# Patient Record
Sex: MALE | Race: WHITE | Employment: OTHER | URBAN - METROPOLITAN AREA
[De-identification: names, ages, dates, MRNs, and addresses within clinical notes are randomized per-mention and may not be internally consistent; named-entity substitution may affect disease eponyms.]

---

## 2021-09-09 ENCOUNTER — TRANSFERRED RECORDS (OUTPATIENT)
Dept: HEALTH INFORMATION MANAGEMENT | Facility: CLINIC | Age: 71
End: 2021-09-09

## 2021-09-10 ENCOUNTER — TRANSFERRED RECORDS (OUTPATIENT)
Dept: HEALTH INFORMATION MANAGEMENT | Facility: CLINIC | Age: 71
End: 2021-09-10

## 2021-09-17 ENCOUNTER — MEDICAL CORRESPONDENCE (OUTPATIENT)
Dept: HEALTH INFORMATION MANAGEMENT | Facility: CLINIC | Age: 71
End: 2021-09-17

## 2021-10-12 ENCOUNTER — TRANSCRIBE ORDERS (OUTPATIENT)
Dept: OTHER | Age: 71
End: 2021-10-12

## 2021-10-12 DIAGNOSIS — J98.6 DIAPHRAGMATIC PARALYSIS: Primary | ICD-10-CM

## 2021-10-12 DIAGNOSIS — R47.02 DYSPHASIA: ICD-10-CM

## 2021-10-13 NOTE — TELEPHONE ENCOUNTER
RECORDS RECEIVED FROM: External   REASON FOR VISIT: Diaphragmatic paralysis [J98.6]  Dysphasia,   Date of Appt: 11/29/21   NOTES (FOR ALL VISITS) STATUS DETAILS   OFFICE NOTE from referring provider Care Everywhere SEE INPATIENT NOTES   OFFICE NOTE from other specialist N/A    DISCHARGE SUMMARY from hospital Care Everywhere [facility]:  9/9/21-10/1/21   DISCHARGE REPORT from the ER N/A    OPERATIVE REPORT N/A    MEDICATION LIST Care Everywhere    IMAGING  (FOR ALL VISITS)     EMG N/A    EEG N/A    LUMBAR PUNCTURE Care Everywhere [facility]:  9/10/21   [place] SCAN N/A    ULTRASOUND (CAROTID BILAT) *VASCULAR* N/A    MRI (HEAD, NECK, SPINE) Received [facility]:  MRI Cervical Spine 9/10/21  MRI Brain 9/10/21   CT (HEAD, NECK, SPINE) N/A       Action 10/13/21 MV 8.58am   Action Taken Images quested from [place]    --10/22/21 MV 2.52pm--  Images resolved in PACS

## 2021-10-28 ENCOUNTER — HOSPITAL ENCOUNTER (OUTPATIENT)
Facility: CLINIC | Age: 71
Setting detail: OBSERVATION
Discharge: NURSING, REHAB OR LONG-TERM CARE | End: 2021-10-29
Attending: EMERGENCY MEDICINE | Admitting: HOSPITALIST
Payer: COMMERCIAL

## 2021-10-28 ENCOUNTER — TRANSFERRED RECORDS (OUTPATIENT)
Dept: HEALTH INFORMATION MANAGEMENT | Facility: CLINIC | Age: 71
End: 2021-10-28

## 2021-10-28 DIAGNOSIS — G47.30 SLEEP APNEA, UNSPECIFIED TYPE: ICD-10-CM

## 2021-10-28 DIAGNOSIS — R06.02 SHORTNESS OF BREATH: ICD-10-CM

## 2021-10-28 DIAGNOSIS — G61.0 GBS (GUILLAIN-BARRE SYNDROME) (H): Primary | ICD-10-CM

## 2021-10-28 LAB
ANION GAP SERPL CALCULATED.3IONS-SCNC: 7 MMOL/L (ref 3–14)
BASOPHILS # BLD AUTO: 0 10E3/UL (ref 0–0.2)
BASOPHILS NFR BLD AUTO: 0 %
BUN SERPL-MCNC: 18 MG/DL (ref 7–30)
CALCIUM SERPL-MCNC: 8.8 MG/DL (ref 8.5–10.1)
CHLORIDE BLD-SCNC: 107 MMOL/L (ref 94–109)
CO2 SERPL-SCNC: 26 MMOL/L (ref 20–32)
CREAT SERPL-MCNC: 0.78 MG/DL (ref 0.66–1.25)
EOSINOPHIL # BLD AUTO: 0 10E3/UL (ref 0–0.7)
EOSINOPHIL NFR BLD AUTO: 0 %
ERYTHROCYTE [DISTWIDTH] IN BLOOD BY AUTOMATED COUNT: 13.2 % (ref 10–15)
GFR SERPL CREATININE-BSD FRML MDRD: 90 ML/MIN/1.73M2
GLUCOSE BLD-MCNC: 287 MG/DL (ref 70–99)
GLUCOSE BLDC GLUCOMTR-MCNC: 248 MG/DL (ref 70–99)
HCT VFR BLD AUTO: 44.6 % (ref 40–53)
HGB BLD-MCNC: 14.4 G/DL (ref 13.3–17.7)
IMM GRANULOCYTES # BLD: 0.3 10E3/UL
IMM GRANULOCYTES NFR BLD: 4 %
LYMPHOCYTES # BLD AUTO: 0.7 10E3/UL (ref 0.8–5.3)
LYMPHOCYTES NFR BLD AUTO: 9 %
MCH RBC QN AUTO: 32.7 PG (ref 26.5–33)
MCHC RBC AUTO-ENTMCNC: 32.3 G/DL (ref 31.5–36.5)
MCV RBC AUTO: 101 FL (ref 78–100)
MONOCYTES # BLD AUTO: 0.3 10E3/UL (ref 0–1.3)
MONOCYTES NFR BLD AUTO: 4 %
NEUTROPHILS # BLD AUTO: 5.9 10E3/UL (ref 1.6–8.3)
NEUTROPHILS NFR BLD AUTO: 83 %
NRBC # BLD AUTO: 0 10E3/UL
NRBC BLD AUTO-RTO: 0 /100
PLATELET # BLD AUTO: 173 10E3/UL (ref 150–450)
POTASSIUM BLD-SCNC: 4.4 MMOL/L (ref 3.4–5.3)
RBC # BLD AUTO: 4.4 10E6/UL (ref 4.4–5.9)
SARS-COV-2 RNA RESP QL NAA+PROBE: NEGATIVE
SODIUM SERPL-SCNC: 140 MMOL/L (ref 133–144)
WBC # BLD AUTO: 7.2 10E3/UL (ref 4–11)

## 2021-10-28 PROCEDURE — 99220 PR INITIAL OBSERVATION CARE,LEVEL III: CPT | Performed by: PHYSICIAN ASSISTANT

## 2021-10-28 PROCEDURE — C9803 HOPD COVID-19 SPEC COLLECT: HCPCS

## 2021-10-28 PROCEDURE — 85025 COMPLETE CBC W/AUTO DIFF WBC: CPT | Performed by: EMERGENCY MEDICINE

## 2021-10-28 PROCEDURE — 36415 COLL VENOUS BLD VENIPUNCTURE: CPT | Performed by: EMERGENCY MEDICINE

## 2021-10-28 PROCEDURE — G0378 HOSPITAL OBSERVATION PER HR: HCPCS

## 2021-10-28 PROCEDURE — 87635 SARS-COV-2 COVID-19 AMP PRB: CPT | Performed by: EMERGENCY MEDICINE

## 2021-10-28 PROCEDURE — 99285 EMERGENCY DEPT VISIT HI MDM: CPT

## 2021-10-28 PROCEDURE — 80048 BASIC METABOLIC PNL TOTAL CA: CPT | Performed by: EMERGENCY MEDICINE

## 2021-10-28 ASSESSMENT — ENCOUNTER SYMPTOMS
NAUSEA: 0
ABDOMINAL PAIN: 0
VOMITING: 0
COUGH: 0
DIARRHEA: 0
CHILLS: 0
FEVER: 0
WEAKNESS: 0

## 2021-10-28 NOTE — ED PROVIDER NOTES
History   Chief Complaint:  Shortness of Breath     The history is provided by the patient.      Fer Chiang is a 71 year old male with history of diaphragm paralysis, staphylococcus aureus bacteremia, GERD, diabetes type 2, who presents with EMS after being discharged from [facility]. He was to be transported to his nursing home but was told that they do not have any BiPAP. He notes that he is normally on BiPAP at night to sleep and needs this at night. He denies any shortness of breath.  He denies cough, fever, chills, chest pain, or new troubles breathing. No emesis, diarrhea, or new weakness. He reports that his tracheostomy tube was taken out 2 days ago.     Review of Systems   Constitutional: Negative for chills and fever.   Respiratory: Negative for cough.    Cardiovascular: Negative for chest pain.   Gastrointestinal: Negative for abdominal pain, diarrhea, nausea and vomiting.   Neurological: Negative for weakness.   All other systems reviewed and are negative.      Allergies:  No known drug allergies     Medications:  Pepcid   Insulin   Vistaril   Zofran   Oxycodone   Senna   Aspirin     Past Medical History:    Neuromuscular disease  HERMES  Neck mass   Hyperlipidemia   Type 2 diabetes  Hypertension   Diaphragmatic paralysis  Acute respiratory failure   MSSA    Past Surgical History:    Colonoscopy x4  Tracheostomy     Family History:    Cancer  CAD, mother     Social History:  Smoking status: former smoker  Alcohol use: no  PCP: Joey Figueroa MD [facility]   Presents to the ED alone; daughter now in room    Physical Exam     Patient Vitals for the past 24 hrs:   BP Temp Temp src Pulse Resp SpO2   10/28/21 1900 124/67 -- -- 104 20 96 %   10/28/21 1735 114/87 97.4  F (36.3  C) Oral 85 21 97 %       Physical Exam  General: Resting on the bed.  Head: No obvious trauma to head.  Ears, Nose, Throat:  External ears normal.  Nose normal.   Eyes:  Conjunctivae clear.  Pupils are equal, round, and reactive.    Neck: Normal range of motion.  Neck supple.   CV: Regular rate and rhythm.  No murmurs.      Respiratory: Effort normal and breath sounds normal.  No wheezing or crackles.   Gastrointestinal: Soft.  No distension. There is no tenderness.   Neuro: Alert. Moving all extremities appropriately.  Normal speech.  CN II-XII grossly intact, no pronator drift, normal finger-nose-finger, visual fields intact.  Gross muscle strength intact of the proximal and distal bilateral upper and able to lift both lower extremities off the bed (baseline).  Sensation intact to light touch in all 4 extremities.   Skin: Skin is warm and dry.  No rash noted.     Emergency Department Course     Laboratory:  Glucose by meter: 248(H)    CBC: WBC: 7.2, HGB: 14.4, PLT: 173  BMP: Glucose 287(H), o/w WNL (Creatinine: 0.78)  Asymptomatic COVID-19 PCR: Pending       Emergency Department Course:  Reviewed:  I reviewed the patient's nursing notes, vitals, past medical records, Care Everywhere.     Assessments:  1736 I performed an assessment and examination of the patient as noted above.      1912 Findings and plan explained to the Patient and family who consents to admission. Discussed the patient with Anahy GARCIA for Dr. Iván Candelario, who will admit the patient to a observation bed for further monitoring, evaluation, and treatment.     Consults:  1916  I spoke to Anahy GARCIA for Dr. Iván Candelario of the hospitalist service who accepts the patient for admission.      Disposition:  The patient was admitted to the hospital under the care of Dr. Iván Candelario.       Impression & Plan   Medical Decision Making:  Fer Chiang is a 71 year old male presents with placement concerns and chronic shortness of breath.  Vital signs stable.  Patient has a very complex medical history of recent past.  He is currently unchanged from his discharge from the rehab facility.  He presents with concerns about his nursing home and lack of BiPAP at his nursing  home.  At this point his daughter and him do not feel comfortable with him returning to the nursing home.  Labs look reassuring.  At this point given the patient is not safe to discharge plan to admit for placement.  No further work-up is required at this time.  Hospitalist graciously accepted.      Covid-19  Fer Chiang was evaluated during a global COVID-19 pandemic, which necessitated consideration that the patient might be at risk for infection with the SARS-CoV-2 virus that causes COVID-19.   Applicable protocols for evaluation were followed during the patient's care.   COVID-19 was considered as part of the patient's evaluation. The plan for testing is:  a test was obtained during this visit.    Diagnosis:    ICD-10-CM    1. Sleep apnea, unspecified type  G47.30    2. Shortness of breath  R06.02      Scribe Disclosure:  SENTHIL, Rere Riley, am serving as a scribe at 5:36 PM on 10/28/2021 to document services personally performed by Tierney Hills MD based on my observations and the provider's statements to me.        Tierney Hills MD  10/28/21 1278

## 2021-10-28 NOTE — ED TRIAGE NOTES
Presents to ED from nursing home. Pt was discharged from another hospital today, sent to nursing home because he needs BiPap, and for some reason, the BiPap did not come to nursing home. Pt sent here because facility doesn't have equipment. Pt states he uses the BiPap when he sleeps. Pt just had long admission for paralyzed diaphragm, had a trach, trach removed 2 days ago. Pt denies any new symptoms, but states he may need to be on BiPap earlier than usual as he has had a long day. ABCs intact on room air. A&OX4.

## 2021-10-29 VITALS
TEMPERATURE: 97.3 F | DIASTOLIC BLOOD PRESSURE: 59 MMHG | HEART RATE: 60 BPM | OXYGEN SATURATION: 95 % | RESPIRATION RATE: 14 BRPM | SYSTOLIC BLOOD PRESSURE: 117 MMHG

## 2021-10-29 LAB
GLUCOSE BLDC GLUCOMTR-MCNC: 189 MG/DL (ref 70–99)
GLUCOSE BLDC GLUCOMTR-MCNC: 386 MG/DL (ref 70–99)
GLUCOSE BLDC GLUCOMTR-MCNC: 85 MG/DL (ref 70–99)

## 2021-10-29 PROCEDURE — 36600 WITHDRAWAL OF ARTERIAL BLOOD: CPT

## 2021-10-29 PROCEDURE — 250N000012 HC RX MED GY IP 250 OP 636 PS 637: Performed by: PHYSICIAN ASSISTANT

## 2021-10-29 PROCEDURE — 99207 PR NO BILLABLE SERVICE THIS VISIT: CPT | Performed by: HOSPITALIST

## 2021-10-29 PROCEDURE — 250N000013 HC RX MED GY IP 250 OP 250 PS 637: Mod: GY | Performed by: PHYSICIAN ASSISTANT

## 2021-10-29 PROCEDURE — 82962 GLUCOSE BLOOD TEST: CPT

## 2021-10-29 PROCEDURE — 94660 CPAP INITIATION&MGMT: CPT

## 2021-10-29 PROCEDURE — 999N000157 HC STATISTIC RCP TIME EA 10 MIN

## 2021-10-29 PROCEDURE — 96372 THER/PROPH/DIAG INJ SC/IM: CPT | Performed by: PHYSICIAN ASSISTANT

## 2021-10-29 PROCEDURE — 99217 PR OBSERVATION CARE DISCHARGE: CPT | Performed by: HOSPITALIST

## 2021-10-29 PROCEDURE — G0378 HOSPITAL OBSERVATION PER HR: HCPCS

## 2021-10-29 RX ORDER — PREDNISONE 5 MG/1
35 TABLET ORAL DAILY
DISCHARGE
Start: 2021-11-14 | End: 2021-11-21

## 2021-10-29 RX ORDER — POLYETHYLENE GLYCOL 3350 17 G/17G
17 POWDER, FOR SOLUTION ORAL DAILY PRN
Status: DISCONTINUED | OUTPATIENT
Start: 2021-10-29 | End: 2021-10-29 | Stop reason: HOSPADM

## 2021-10-29 RX ORDER — ONDANSETRON 4 MG/1
4 TABLET, ORALLY DISINTEGRATING ORAL EVERY 6 HOURS PRN
Status: DISCONTINUED | OUTPATIENT
Start: 2021-10-29 | End: 2021-10-29 | Stop reason: HOSPADM

## 2021-10-29 RX ORDER — PREDNISONE 20 MG/1
20 TABLET ORAL DAILY
DISCHARGE
Start: 2021-12-05 | End: 2021-12-12

## 2021-10-29 RX ORDER — METOPROLOL TARTRATE 25 MG/1
25 TABLET, FILM COATED ORAL 2 TIMES DAILY
Status: DISCONTINUED | OUTPATIENT
Start: 2021-10-29 | End: 2021-10-29

## 2021-10-29 RX ORDER — ASPIRIN 81 MG/1
81 TABLET, CHEWABLE ORAL AT BEDTIME
COMMUNITY
Start: 2021-10-01

## 2021-10-29 RX ORDER — LANOLIN ALCOHOL/MO/W.PET/CERES
6 CREAM (GRAM) TOPICAL AT BEDTIME
COMMUNITY
Start: 2021-10-01

## 2021-10-29 RX ORDER — DEXTROSE MONOHYDRATE 25 G/50ML
25-50 INJECTION, SOLUTION INTRAVENOUS
Status: DISCONTINUED | OUTPATIENT
Start: 2021-10-29 | End: 2021-10-29 | Stop reason: HOSPADM

## 2021-10-29 RX ORDER — ACETAMINOPHEN 325 MG/1
650 TABLET ORAL EVERY 8 HOURS PRN
COMMUNITY
Start: 2021-10-01

## 2021-10-29 RX ORDER — PREDNISONE 10 MG/1
30 TABLET ORAL DAILY
Status: ON HOLD | COMMUNITY
Start: 2021-11-21 | End: 2021-10-29

## 2021-10-29 RX ORDER — SULFAMETHOXAZOLE/TRIMETHOPRIM 800-160 MG
1 TABLET ORAL
COMMUNITY
Start: 2021-10-29

## 2021-10-29 RX ORDER — ACETAMINOPHEN 650 MG/1
650 SUPPOSITORY RECTAL EVERY 6 HOURS PRN
Status: DISCONTINUED | OUTPATIENT
Start: 2021-10-29 | End: 2021-10-29 | Stop reason: HOSPADM

## 2021-10-29 RX ORDER — OXYCODONE HYDROCHLORIDE 5 MG/1
5 TABLET ORAL EVERY 4 HOURS PRN
Status: DISCONTINUED | OUTPATIENT
Start: 2021-10-29 | End: 2021-10-29

## 2021-10-29 RX ORDER — SULFAMETHOXAZOLE/TRIMETHOPRIM 800-160 MG
1 TABLET ORAL
Status: DISCONTINUED | OUTPATIENT
Start: 2021-10-29 | End: 2021-10-29

## 2021-10-29 RX ORDER — PYRIDOSTIGMINE BROMIDE 60 MG/1
60 TABLET ORAL EVERY 6 HOURS
Status: DISCONTINUED | OUTPATIENT
Start: 2021-10-29 | End: 2021-10-29

## 2021-10-29 RX ORDER — OXYCODONE HYDROCHLORIDE 5 MG/1
5 TABLET ORAL EVERY 6 HOURS PRN
Status: DISCONTINUED | OUTPATIENT
Start: 2021-10-29 | End: 2021-10-29 | Stop reason: HOSPADM

## 2021-10-29 RX ORDER — NICOTINE POLACRILEX 4 MG
15-30 LOZENGE BUCCAL
Status: DISCONTINUED | OUTPATIENT
Start: 2021-10-29 | End: 2021-10-29 | Stop reason: HOSPADM

## 2021-10-29 RX ORDER — PREDNISONE 5 MG/1
15 TABLET ORAL DAILY
Status: ON HOLD | COMMUNITY
Start: 2021-12-12 | End: 2021-10-29

## 2021-10-29 RX ORDER — PREDNISONE 20 MG/1
20 TABLET ORAL DAILY
Status: ON HOLD | COMMUNITY
Start: 2021-12-05 | End: 2021-10-29

## 2021-10-29 RX ORDER — PYRIDOSTIGMINE BROMIDE 60 MG/1
60 TABLET ORAL EVERY 6 HOURS SCHEDULED
Status: DISCONTINUED | OUTPATIENT
Start: 2021-10-29 | End: 2021-10-29

## 2021-10-29 RX ORDER — PREDNISONE 20 MG/1
40 TABLET ORAL DAILY
Status: ON HOLD | COMMUNITY
Start: 2021-11-07 | End: 2021-10-29

## 2021-10-29 RX ORDER — PREDNISONE 5 MG/1
15 TABLET ORAL DAILY
DISCHARGE
Start: 2021-12-12 | End: 2021-12-19

## 2021-10-29 RX ORDER — SULFAMETHOXAZOLE/TRIMETHOPRIM 800-160 MG
1 TABLET ORAL
Status: DISCONTINUED | OUTPATIENT
Start: 2021-10-30 | End: 2021-10-29

## 2021-10-29 RX ORDER — PYRIDOSTIGMINE BROMIDE 60 MG/1
60 TABLET ORAL EVERY 6 HOURS
COMMUNITY
Start: 2021-10-01

## 2021-10-29 RX ORDER — INSULIN GLARGINE 100 [IU]/ML
12 INJECTION, SOLUTION SUBCUTANEOUS AT BEDTIME
Status: ON HOLD | COMMUNITY
Start: 2021-10-28 | End: 2021-10-29

## 2021-10-29 RX ORDER — PREDNISONE 5 MG/1
45 TABLET ORAL DAILY
DISCHARGE
Start: 2021-10-31 | End: 2021-11-07

## 2021-10-29 RX ORDER — SULFAMETHOXAZOLE/TRIMETHOPRIM 800-160 MG
1 TABLET ORAL
Status: DISCONTINUED | OUTPATIENT
Start: 2021-11-01 | End: 2021-10-29 | Stop reason: HOSPADM

## 2021-10-29 RX ORDER — PYRIDOSTIGMINE BROMIDE 60 MG/1
60 TABLET ORAL EVERY 6 HOURS
Status: DISCONTINUED | OUTPATIENT
Start: 2021-10-29 | End: 2021-10-29 | Stop reason: HOSPADM

## 2021-10-29 RX ORDER — PREDNISONE 10 MG/1
30 TABLET ORAL DAILY
DISCHARGE
Start: 2021-11-21 | End: 2021-11-28

## 2021-10-29 RX ORDER — FAMOTIDINE 20 MG/1
20 TABLET, FILM COATED ORAL 2 TIMES DAILY
Status: DISCONTINUED | OUTPATIENT
Start: 2021-10-29 | End: 2021-10-29

## 2021-10-29 RX ORDER — PREDNISONE 10 MG/1
10 TABLET ORAL DAILY
DISCHARGE
Start: 2021-12-19 | End: 2021-12-26

## 2021-10-29 RX ORDER — METOPROLOL TARTRATE 25 MG/1
25 TABLET, FILM COATED ORAL 2 TIMES DAILY
Status: DISCONTINUED | OUTPATIENT
Start: 2021-10-29 | End: 2021-10-29 | Stop reason: HOSPADM

## 2021-10-29 RX ORDER — PREDNISONE 5 MG/1
35 TABLET ORAL DAILY
Status: ON HOLD | COMMUNITY
Start: 2021-11-14 | End: 2021-10-29

## 2021-10-29 RX ORDER — NALOXONE HYDROCHLORIDE 0.4 MG/ML
0.4 INJECTION, SOLUTION INTRAMUSCULAR; INTRAVENOUS; SUBCUTANEOUS
Status: DISCONTINUED | OUTPATIENT
Start: 2021-10-29 | End: 2021-10-29 | Stop reason: HOSPADM

## 2021-10-29 RX ORDER — SENNOSIDES A AND B 8.6 MG/1
17.2 TABLET, FILM COATED ORAL
COMMUNITY
Start: 2021-10-01

## 2021-10-29 RX ORDER — PREDNISONE 5 MG/1
25 TABLET ORAL DAILY
DISCHARGE
Start: 2021-11-28 | End: 2021-12-05

## 2021-10-29 RX ORDER — PREDNISONE 5 MG/1
25 TABLET ORAL DAILY
Status: ON HOLD | COMMUNITY
Start: 2021-11-28 | End: 2021-10-29

## 2021-10-29 RX ORDER — PREDNISONE 5 MG/1
5 TABLET ORAL DAILY
DISCHARGE
Start: 2021-12-26 | End: 2022-01-02

## 2021-10-29 RX ORDER — AMOXICILLIN 250 MG
2 CAPSULE ORAL 2 TIMES DAILY PRN
Status: DISCONTINUED | OUTPATIENT
Start: 2021-10-29 | End: 2021-10-29 | Stop reason: HOSPADM

## 2021-10-29 RX ORDER — NALOXONE HYDROCHLORIDE 0.4 MG/ML
0.2 INJECTION, SOLUTION INTRAMUSCULAR; INTRAVENOUS; SUBCUTANEOUS
Status: DISCONTINUED | OUTPATIENT
Start: 2021-10-29 | End: 2021-10-29 | Stop reason: HOSPADM

## 2021-10-29 RX ORDER — ONDANSETRON 2 MG/ML
4 INJECTION INTRAMUSCULAR; INTRAVENOUS EVERY 6 HOURS PRN
Status: DISCONTINUED | OUTPATIENT
Start: 2021-10-29 | End: 2021-10-29 | Stop reason: HOSPADM

## 2021-10-29 RX ORDER — PREDNISONE 50 MG/1
50 TABLET ORAL DAILY
Status: ON HOLD | COMMUNITY
Start: 2021-10-28 | End: 2021-10-29

## 2021-10-29 RX ORDER — CALCIUM CARBONATE 500(1250)
500 TABLET,CHEWABLE ORAL EVERY 6 HOURS PRN
COMMUNITY
Start: 2021-10-28

## 2021-10-29 RX ORDER — AMOXICILLIN 250 MG
1 CAPSULE ORAL 2 TIMES DAILY PRN
Status: DISCONTINUED | OUTPATIENT
Start: 2021-10-29 | End: 2021-10-29 | Stop reason: HOSPADM

## 2021-10-29 RX ORDER — POLYETHYLENE GLYCOL 3350 17 G/17G
17 POWDER, FOR SOLUTION ORAL DAILY
Status: DISCONTINUED | OUTPATIENT
Start: 2021-10-29 | End: 2021-10-29 | Stop reason: HOSPADM

## 2021-10-29 RX ORDER — HYDROXYZINE HYDROCHLORIDE 25 MG/1
25 TABLET, FILM COATED ORAL EVERY 6 HOURS PRN
Status: DISCONTINUED | OUTPATIENT
Start: 2021-10-29 | End: 2021-10-29

## 2021-10-29 RX ORDER — OXYCODONE HYDROCHLORIDE 5 MG/1
5 TABLET ORAL EVERY 8 HOURS PRN
Qty: 30 TABLET | Refills: 0 | Status: SHIPPED | OUTPATIENT
Start: 2021-10-29 | End: 2021-11-05

## 2021-10-29 RX ORDER — PREDNISONE 50 MG/1
50 TABLET ORAL DAILY
DISCHARGE
Start: 2021-10-29 | End: 2021-10-31

## 2021-10-29 RX ORDER — POLYETHYLENE GLYCOL 3350 17 G/17G
17 POWDER, FOR SOLUTION ORAL DAILY
COMMUNITY
Start: 2021-10-01

## 2021-10-29 RX ORDER — PREDNISONE 20 MG/1
40 TABLET ORAL DAILY
DISCHARGE
Start: 2021-11-07 | End: 2021-11-14

## 2021-10-29 RX ORDER — FAMOTIDINE 10 MG
10 TABLET ORAL 2 TIMES DAILY
Status: DISCONTINUED | OUTPATIENT
Start: 2021-10-29 | End: 2021-10-29

## 2021-10-29 RX ORDER — ACETAMINOPHEN 325 MG/1
650 TABLET ORAL EVERY 6 HOURS PRN
Status: DISCONTINUED | OUTPATIENT
Start: 2021-10-29 | End: 2021-10-29

## 2021-10-29 RX ORDER — HYDROXYZINE HYDROCHLORIDE 25 MG/1
25 TABLET, FILM COATED ORAL EVERY 6 HOURS PRN
COMMUNITY
Start: 2021-10-28

## 2021-10-29 RX ORDER — ACETAMINOPHEN 325 MG/1
650 TABLET ORAL EVERY 8 HOURS PRN
Status: DISCONTINUED | OUTPATIENT
Start: 2021-10-29 | End: 2021-10-29 | Stop reason: HOSPADM

## 2021-10-29 RX ORDER — FAMOTIDINE 20 MG/1
20 TABLET, FILM COATED ORAL 2 TIMES DAILY
Status: DISCONTINUED | OUTPATIENT
Start: 2021-10-29 | End: 2021-10-29 | Stop reason: HOSPADM

## 2021-10-29 RX ORDER — ASPIRIN 81 MG/1
81 TABLET, CHEWABLE ORAL AT BEDTIME
Status: DISCONTINUED | OUTPATIENT
Start: 2021-10-29 | End: 2021-10-29 | Stop reason: HOSPADM

## 2021-10-29 RX ORDER — PREDNISONE 5 MG/1
5 TABLET ORAL DAILY
Status: ON HOLD | COMMUNITY
Start: 2021-12-26 | End: 2021-10-29

## 2021-10-29 RX ORDER — METOPROLOL TARTRATE 25 MG/1
25 TABLET, FILM COATED ORAL 2 TIMES DAILY
COMMUNITY
Start: 2021-10-28

## 2021-10-29 RX ORDER — PREDNISONE 10 MG/1
10 TABLET ORAL DAILY
Status: ON HOLD | COMMUNITY
Start: 2021-12-19 | End: 2021-10-29

## 2021-10-29 RX ORDER — HYDROXYZINE HYDROCHLORIDE 25 MG/1
25 TABLET, FILM COATED ORAL EVERY 6 HOURS PRN
Status: DISCONTINUED | OUTPATIENT
Start: 2021-10-29 | End: 2021-10-29 | Stop reason: HOSPADM

## 2021-10-29 RX ORDER — LIDOCAINE 40 MG/G
CREAM TOPICAL
Status: DISCONTINUED | OUTPATIENT
Start: 2021-10-29 | End: 2021-10-29 | Stop reason: HOSPADM

## 2021-10-29 RX ORDER — OXYCODONE HYDROCHLORIDE 5 MG/1
5 TABLET ORAL EVERY 8 HOURS PRN
Status: ON HOLD | COMMUNITY
Start: 2021-10-28 | End: 2021-10-29

## 2021-10-29 RX ORDER — PREDNISONE 5 MG/1
45 TABLET ORAL DAILY
Status: ON HOLD | COMMUNITY
Start: 2021-10-31 | End: 2021-10-29

## 2021-10-29 RX ORDER — FAMOTIDINE 20 MG/1
20 TABLET, FILM COATED ORAL 2 TIMES DAILY
COMMUNITY
Start: 2021-10-01

## 2021-10-29 RX ADMIN — PYRIDOSTIGMINE BROMIDE 60 MG: 60 TABLET ORAL at 01:31

## 2021-10-29 RX ADMIN — PREDNISONE 50 MG: 5 TABLET ORAL at 08:42

## 2021-10-29 RX ADMIN — FAMOTIDINE 10 MG: 10 TABLET ORAL at 08:42

## 2021-10-29 RX ADMIN — ACETAMINOPHEN 650 MG: 325 TABLET, FILM COATED ORAL at 01:31

## 2021-10-29 RX ADMIN — METOPROLOL TARTRATE 25 MG: 25 TABLET, FILM COATED ORAL at 08:42

## 2021-10-29 RX ADMIN — SULFAMETHOXAZOLE AND TRIMETHOPRIM 1 TABLET: 800; 160 TABLET ORAL at 08:47

## 2021-10-29 RX ADMIN — PYRIDOSTIGMINE BROMIDE 60 MG: 60 TABLET ORAL at 06:39

## 2021-10-29 RX ADMIN — INSULIN GLARGINE 12 UNITS: 100 INJECTION, SOLUTION SUBCUTANEOUS at 01:32

## 2021-10-29 RX ADMIN — Medication 1 MG: at 04:53

## 2021-10-29 NOTE — PROGRESS NOTES
ROOM # 212-1    Living Situation (if not independent, order SW consult): Long term care   Facility name: [facility]? Unsure of name per patient. New placement.   : Daughter     Activity level at baseline: Indpt   Activity level on admit: SBA      Patient registered to observation; given Patient Bill of Rights; given the opportunity to ask questions about observation status and their plan of care.  Patient has been oriented to the observation room, bathroom and call light is in place.    Discussed discharge goals and expectations with patient/family.

## 2021-10-29 NOTE — DISCHARGE SUMMARY
[facility]  Hospitalist Discharge Summary      Date of Admission:  10/28/2021  Date of Discharge:  10/29/2021  Discharging Provider: Iván Candelario MD      Discharge Diagnoses   Social admit from his long term care facility. Was waiting for his BiPAP machine    Follow-ups Needed After Discharge   Follow-up Appointments     Follow Up and recommended labs and tests      Follow up with Nursing home physician.  No follow up labs or test are   needed.             Unresulted Labs Ordered in the Past 30 Days of this Admission     No orders found for last 31 day(s).      These results will be followed up by NA    Discharge Disposition   Discharged to long-term care facility  Condition at discharge: Stable      Hospital Course   Fer Minor is a 71 year old male who presents by EMS after he arrived at his long-term care facility without a BiPAP being available for sleeping.  Due to lack of this he was sent to the emergency room.     His history is quite complex beginning on September 9 after presenting to the emergency room with shortness of breath and hypoxia.  In addition to this he had lower extremity weakness and dysphagia.  Imaging showed bilateral diaphragmatic paralysis and he was intubated on September 9 for respiratory distress.  It was suspected that he had either Guillain-Barré, post viral neuropathy or myasthenia gravis.  He received plasmapheresis September 11-16 and started on Mestinon.  Neurology was consulted recommending high-dose steroids for 30 days followed by slow taper with continuation of pyridostigmine and glycopyrrolate.  He was extubated on September 20 but required BiPAP that evening with continued work of breathing and eventually a tracheostomy was placed on September 22.  GJ tube was placed on September 29.  His admission was complicated by positive blood cultures on 9/15 for MSSA with initiation on vancomycin eventually transition to Ancef.  AMANDA on 9/27 did not  show vegetations and he received IV cefazolin for 2 weeks ending on 10/4.  He was discharged to a rehab facility on 10/1 and decannulated on 10/26.      He was transported to long term facility today but unfortunately upon arrival they realized he did not have BiPAP available for that evening. He reports everyone seemed confused and he knew he needed that so was sent back to ED. Apparently, now they have the BiPAP at facility but his daughter is refusing to send him back there.         Patient was stable here overnight.  He has no new complaints today.  He is eating breakfast.  He is agreeable to discharging back to his long-term care facility, which has his BiPAP machine now.  I have spoken with our  who will set up transport.  I called his daughter and left a message.  Patient will discharge to his long-term care facility.  No medication changes were made.  He should resume the exact orders that the long-term care facility had received from the TCU.    Consultations This Hospital Stay   CARE MANAGEMENT / SOCIAL WORK IP CONSULT  PHYSICAL THERAPY ADULT IP CONSULT  OCCUPATIONAL THERAPY ADULT IP CONSULT    Code Status   Full Code    Time Spent on this Encounter   I, Iván Candelario MD, personally saw the patient today and spent greater than 30 minutes discharging this patient.       Iván Candelario MD  [facility] OBSERVATION DEPT  [address] [place] [address]  Phone: [phone]  ______________________________________________________________________    Physical Exam   Vital Signs: Temp: 97.6  F (36.4  C) Temp src: Axillary BP: 128/65 Pulse: 66   Resp: 20 SpO2: 96 % O2 Device: BiPAP/CPAP    Weight: 0 lbs 0 oz  Constitutional: awake, alert, cooperative, no apparent distress, and appears stated age  Eyes: Lids and lashes normal, pupils equal, round and reactive to light, extra ocular muscles intact, sclera clear, conjunctiva normal  ENT: Normocephalic, without obvious  abnormality, atraumatic, sinuses nontender on palpation, external ears without lesions, oral pharynx with moist mucous membranes, tonsils without erythema or exudates, gums normal and good dentition.  Respiratory: No increased work of breathing, good air exchange, clear to auscultation bilaterally, no crackles or wheezing  Cardiovascular: Normal apical impulse, regular rate and rhythm, normal S1 and S2, no S3 or S4, and no murmur noted  GI: No scars, normal bowel sounds, soft, non-distended, non-tender, no masses palpated, no hepatosplenomegally       Primary Care Physician   [facility]    Discharge Orders      General info for SNF    Length of Stay Estimate: Long Term Care  Condition at Discharge: Stable  Level of care:skilled   Rehabilitation Potential: Fair  Admission H&P remains valid and up-to-date: Yes  Recent Chemotherapy: N/A  Use Nursing Home Standing Orders: Yes     Mantoux instructions    Give two-step Mantoux (PPD) Per Facility Policy Yes     Follow Up and recommended labs and tests    Follow up with Nursing home physician.  No follow up labs or test are needed.     Reason for your hospital stay    Social admission. Long term care had not received BiPAP machine     Glucose monitor nursing POCT    Before meals and at bedtime     Activity - Up with nursing assistance     Additional Discharge Instructions    BiPAP for sleeping (uses AVAPS mode with last settings RR 16, , EPAP 8, FiO2 30%     Full Code     Physical Therapy Adult Consult    Evaluate and treat as clinically indicated.    Reason:  weakness     Occupational Therapy Adult Consult    Evaluate and treat as clinically indicated.    Reason:  weakness     Fall precautions     Diet    Follow this diet upon discharge: Orders Placed This Encounter      Regular Diet Adult       Significant Results and Procedures   Most Recent 3 CBC's:Recent Labs   Lab Test 10/28/21  1901   WBC 7.2   HGB 14.4   *        Most Recent  3 BMP's:Recent Labs   Lab Test 10/29/21  0740 10/29/21  0113 10/28/21  1901   NA  --   --  140   POTASSIUM  --   --  4.4   CHLORIDE  --   --  107   CO2  --   --  26   BUN  --   --  18   CR  --   --  0.78   ANIONGAP  --   --  7   WILLIAM  --   --  8.8   GLC 85 189* 287*     Most Recent 2 LFT's:No lab results found., No results found for this or any previous visit.    Discharge Medications   Current Discharge Medication List      CONTINUE these medications which have NOT CHANGED    Details   acetaminophen (TYLENOL) 325 MG tablet Take 650 mg by mouth every 8 hours as needed      aspirin (ASA) 81 MG chewable tablet 81 mg by Tube route At Bedtime      calcium carbonate 500 mg, elemental, 1250 (500 Ca) MG tablet chewable 500 mg by Tube route every 6 hours as needed for heartburn      famotidine (PEPCID) 20 MG tablet 20 mg by Tube route 2 times daily      glucagon 1 MG kit Inject 1 mg into the muscle as needed For blood sugar <70 mg/dl      hydrOXYzine (ATARAX) 25 MG tablet 25 mg by Tube route every 6 hours as needed for anxiety      !! insulin glargine (LANTUS PEN) 100 UNIT/ML pen Inject 12 Units Subcutaneous At Bedtime      !! insulin glargine (LANTUS SOLOSTAR) 100 UNIT/ML pen Inject 18 Units Subcutaneous daily      insulin lispro (HUMALOG VIAL) 100 UNIT/ML vial Inject 0-16 Units Subcutaneous 4 times daily Before meals and nightly      melatonin 3 MG tablet 6 mg by Per G Tube route At Bedtime      metoprolol tartrate (LOPRESSOR) 25 MG tablet 25 mg by Tube route 2 times daily      oxyCODONE (ROXICODONE) 5 MG tablet 5 mg by Tube route every 8 hours as needed For moderate pain for up to 7 days. Max Daily Amount: 15 mg.      polyethylene glycol (MIRALAX) 17 GM/Dose powder 17 g by Tube route daily      !! predniSONE (DELTASONE) 50 MG tablet 50 mg by Tube route daily For 3 doses.      pyridostigmine (MESTINON) 60 MG tablet 60 mg by Tube route every 6 hours      senna (SENOKOT) 8.6 MG tablet Take 17.2 mg by mouth nightly as needed       sulfamethoxazole-trimethoprim (BACTRIM DS) 800-160 MG tablet 1 tablet by Tube route three times a week      !! predniSONE (DELTASONE) 10 MG tablet 30 mg by Tube route daily For 7 doses      !! predniSONE (DELTASONE) 10 MG tablet 10 mg by Tube route daily For 7 days      !! predniSONE (DELTASONE) 20 MG tablet 40 mg by Tube route daily      !! predniSONE (DELTASONE) 20 MG tablet 20 mg by Tube route daily For 7 days      !! predniSONE (DELTASONE) 5 MG tablet 45 mg by Tube route daily For 7 doses.      !! predniSONE (DELTASONE) 5 MG tablet 35 mg by Tube route daily For 7 doses      !! predniSONE (DELTASONE) 5 MG tablet 25 mg by Tube route daily For 7 doses      !! predniSONE (DELTASONE) 5 MG tablet 15 mg by Tube route daily For 7 days      !! predniSONE (DELTASONE) 5 MG tablet 5 mg by Tube route daily For 7 days       !! - Potential duplicate medications found. Please discuss with provider.        predniSONE (DELTASONE) 10 MG tablet   Administer 1 tablet (10 mg total) per tube daily for 7 doses.   0 12/19/2021 12/26/2021   predniSONE (DELTASONE) 10 MG tablet   Administer 3 tablets (30 mg total) per tube daily for 7 doses.   0 11/21/2021 11/28/2021   predniSONE (DELTASONE) 20 MG tablet   Administer 1 tablet (20 mg total) per tube daily for 7 doses.   0 12/05/2021 12/12/2021   predniSONE (DELTASONE) 20 MG tablet   Administer 2 tablets (40 mg total) per tube daily for 7 doses.   0 11/07/2021 11/14/2021   predniSONE (DELTASONE) 5 MG tablet   Administer 3 tablets (15 mg total) per tube daily for 7 doses.   0 12/12/2021 12/19/2021   predniSONE (DELTASONE) 5 MG tablet   Administer 5 tablets (25 mg total) per tube daily for 7 doses.   0 11/28/2021 12/05/2021   predniSONE (DELTASONE) 5 MG tablet   Administer 7 tablets (35 mg total) per tube daily for 7 doses.   0 11/14/2021 11/21/2021   predniSONE (DELTASONE) 5 MG tablet   Administer 9 tablets (45 mg total) per tube daily for 7 doses.   0 10/31/2021 11/07/2021   predniSONE  (DELTASONE) 5 MG tablet   Administer 1 tablet (5 mg total) per tube daily for 7 doses.   0 12/26/2021 01/02/2022   predniSONE (DELTASONE) 50 MG tablet   Administer 1 tablet (50 mg total) per tube daily for 3 doses.   0 10/28/2021 10/31/2021       Allergies   No Known Allergies

## 2021-10-29 NOTE — PLAN OF CARE
PRIMARY DIAGNOSIS: Shortness of breath and Sleep apnea with BiPAP Use.   OUTPATIENT/OBSERVATION GOALS TO BE MET BEFORE DISCHARGE:  1. ADLs back to baseline: Yes    2. Activity and level of assistance: Up with standby assistance.    3. Pain status: Improved-controlled with oral pain medications.    4. Return to near baseline physical activity: Yes    Temp: 97.6  F (36.4  C) Temp src: Axillary BP: 131/67 Pulse: 65   Resp: 16 SpO2: 96 % O2 Device: BiPAP/CPAP       Patient is Alert and Oriented x4. He is complaining of 2/10 headache and lower back pain. Tylenol given for pain with effective result. Patient is Saline locked. Pt is a Regular diet. He is SBA with no assistive devices. Plan is for social work consult with plan to discharge back to long term care.     Discharge Planner Nurse   Safe discharge environment identified: Yes  Barriers to discharge: Yes       Entered by: Fadia Jefferson 10/29/2021 4:39 AM     Please review provider order for any additional goals.   Nurse to notify provider when observation goals have been met and patient is ready for discharge.

## 2021-10-29 NOTE — PHARMACY-ADMISSION MEDICATION HISTORY
Admission medication history interview status for this patient is complete. See [facility] admission navigator for allergy information, prior to admission medications and immunization status.     Medication history interview done, indicate source(s): -  Medication history resources (including written lists, pill bottles, clinic record): Discharge medication list from [facility], dated 10/28/2021.     Pharmacy: -    Changes made to PTA medication list:  Added: all medications  Changed: none  Removed: none  Actions taken by pharmacist (provider contacted, etc):None     Additional medication history information:None    Medication reconciliation/reorder completed by provider prior to medication history?  N   (Y/N)     Prior to Admission medications    Medication Sig Last Dose Taking? Auth Provider   acetaminophen (TYLENOL) 325 MG tablet Take 650 mg by mouth every 8 hours as needed  Yes Unknown, Entered By History   aspirin (ASA) 81 MG chewable tablet 81 mg by Tube route At Bedtime  Yes Unknown, Entered By History   calcium carbonate 500 mg, elemental, 1250 (500 Ca) MG tablet chewable 500 mg by Tube route every 6 hours as needed for heartburn  Yes Unknown, Entered By History   famotidine (PEPCID) 20 MG tablet 20 mg by Tube route 2 times daily  Yes Unknown, Entered By History   glucagon 1 MG kit Inject 1 mg into the muscle as needed For blood sugar <70 mg/dl  Yes Unknown, Entered By History   hydrOXYzine (ATARAX) 25 MG tablet 25 mg by Tube route every 6 hours as needed for anxiety  Yes Unknown, Entered By History   insulin glargine (LANTUS PEN) 100 UNIT/ML pen Inject 12 Units Subcutaneous At Bedtime  Yes Unknown, Entered By History   insulin glargine (LANTUS SOLOSTAR) 100 UNIT/ML pen Inject 18 Units Subcutaneous daily  Yes Unknown, Entered By History   insulin lispro (HUMALOG VIAL) 100 UNIT/ML vial Inject 0-16 Units Subcutaneous 4 times daily Before meals and nightly  Yes Unknown, Entered By History    melatonin 3 MG tablet 6 mg by Per G Tube route At Bedtime  Yes Unknown, Entered By History   metoprolol tartrate (LOPRESSOR) 25 MG tablet 25 mg by Tube route 2 times daily  Yes Unknown, Entered By History   oxyCODONE (ROXICODONE) 5 MG tablet 5 mg by Tube route every 8 hours as needed For moderate pain for up to 7 days. Max Daily Amount: 15 mg.  Yes Unknown, Entered By History   polyethylene glycol (MIRALAX) 17 GM/Dose powder 17 g by Tube route daily  Yes Unknown, Entered By History   predniSONE (DELTASONE) 50 MG tablet 50 mg by Tube route daily For 3 doses.  Yes Unknown, Entered By History   pyridostigmine (MESTINON) 60 MG tablet 60 mg by Tube route every 6 hours  Yes Unknown, Entered By History   senna (SENOKOT) 8.6 MG tablet Take 17.2 mg by mouth nightly as needed  Yes Unknown, Entered By History   sulfamethoxazole-trimethoprim (BACTRIM DS) 800-160 MG tablet 1 tablet by Tube route three times a week  Yes Unknown, Entered By History   predniSONE (DELTASONE) 10 MG tablet 30 mg by Tube route daily For 7 doses   Unknown, Entered By History   predniSONE (DELTASONE) 10 MG tablet 10 mg by Tube route daily For 7 days   Unknown, Entered By History   predniSONE (DELTASONE) 20 MG tablet 40 mg by Tube route daily   Unknown, Entered By History   predniSONE (DELTASONE) 20 MG tablet 20 mg by Tube route daily For 7 days   Unknown, Entered By History   predniSONE (DELTASONE) 5 MG tablet 45 mg by Tube route daily For 7 doses.   Unknown, Entered By History   predniSONE (DELTASONE) 5 MG tablet 35 mg by Tube route daily For 7 doses   Unknown, Entered By History   predniSONE (DELTASONE) 5 MG tablet 25 mg by Tube route daily For 7 doses   Unknown, Entered By History   predniSONE (DELTASONE) 5 MG tablet 15 mg by Tube route daily For 7 days   Unknown, Entered By History   predniSONE (DELTASONE) 5 MG tablet 5 mg by Tube route daily For 7 days   Unknown, Entered By History

## 2021-10-29 NOTE — PROGRESS NOTES
IATF faxed to [facility] nurse to nurse report given to April   Discharge medications sent home with patient/family: YES - script to facility   Discharged with other:[facility] to [facility]       OBSERVATION patient END time: 1600

## 2021-10-29 NOTE — PLAN OF CARE
PRIMARY DIAGNOSIS: Placement needs - needs facility with bipap capability   OUTPATIENT/OBSERVATION GOALS TO BE MET BEFORE DISCHARGE:  1. ADLs back to baseline: Yes    2. Activity and level of assistance: Up with standby assistance.    3 Pain status:Denies     4. Return to near baseline physical activity: Yes    Temp: 97.6  F (36.4  C) Temp src: Axillary BP: 128/65 Pulse: 66   Resp: 20 SpO2: 96 % O2 Device: BiPAP/CPAP       Plan for discharge back to [facility] per provider. Pt tolerating oral food and pills. G/J tube site C, D, I.     Discharge Planner Nurse   Safe discharge environment identified: Yes  Barriers to discharge: Yes       Entered by: Miek Jenkins 10/29/2021 11:01 AM     Please review provider order for any additional goals.   Nurse to notify provider when observation goals have been met and patient is ready for discharge.

## 2021-10-29 NOTE — PLAN OF CARE
PRIMARY DIAGNOSIS: Shortness of breath and Sleep apnea with BiPAP Use.   OUTPATIENT/OBSERVATION GOALS TO BE MET BEFORE DISCHARGE:  1. ADLs back to baseline: Yes    2. Activity and level of assistance: Up with standby assistance.    3. Pain status: Improved-controlled with oral pain medications.    4. Return to near baseline physical activity: Yes    Vitals are Temp: 97.7  F (36.5  C)Temp src: Oral BP: 121/68 Pulse: 105 Resp: 18 SpO2: 96 %.    Patient is Alert and Oriented x4. He is complaining of 2/10 headache and lower back pain. Tylenol given for pain with effective result. Patient is Saline locked. Pt is a Regular diet. He is SBA with no assistive devices. Will continue to monitor.     Discharge Planner Nurse   Safe discharge environment identified: Yes  Barriers to discharge: Yes       Entered by: Fadia Jefferson 10/29/2021 1:39 AM     Please review provider order for any additional goals.   Nurse to notify provider when observation goals have been met and patient is ready for discharge.

## 2021-10-29 NOTE — H&P
History and Physical     Fer Minor MRN# [id number]   YOB: 1950 Age: 71 year old      Date of Admission:  10/28/2021    Primary care provider: [facility]          Assessment and Plan:   Fer Minor is a medically complex 71 year old male who was just discharged from his rehabilitation facility (admitted 10/1 through 10/28) today to a long-term care center but unfortunately the long-term care center had not coordinated his BiPAP to be there on arrival so he was sent to the hospital.    Medical history includes recent acute hypoxemic respiratory failure due to bilateral diaphragmatic paralysis requiring intubation and subsequent trach placement, suspected myasthenia gravis on steroid taper, recent MSSA bacteremia, type 2 diabetes and hypertension    Patient was discussed with Dr. Hills, who was provider in ED. Chart review of ED work up was reviewed as well as chart review of Care Everywhere, previous visits and admissions.     I did as much of med rec as I could without pharmacy seeing him. You will need to readdress morning insulin.    #Placement issue  Patient needs overnight BiPAP due to bilateral diaphragmatic paralysis.  He was discharged from a rehab facility today to long-term care facility but unfortunately the BiPAP was not at his new facility upon arrival.  He reported that people seemed confused which scared him and prompted EMS to be called to return him to the ER.  Reportedly they did have the BiPAP now but his daughter does not want him to go back there.  On my conversation with him he is agreeable to going back to the facility tomorrow as he realizes there are a lack of facilities available.  -Social work consult to help coordinate hopeful transfer back to his long-term care facility.  This will involve conversation with his daughter who reportedly is reluctant to send him back there.    #Hypoxemic respiratory failure due to bilateral diaphragmatic  paralysis  Patient was decannulated on 10/26 requiring only stoma care as well as overnight BiPAP.  He is scheduled to have a sleep study on 10/31 that he needs to attend.  -BiPAP for sleeping (uses AVAPS mode with last settings RR 16, , EPAP 8, FiO2 30%  -Per speech note patient is on a regular diet but does have a GJ tube that was placed on 9/29 that needs to be flushed daily.  Okay to reduce flushes to 60 mils of water via G and J ports every 12 hours.  -Sit up all the way when eating and drinking  -Stoma care includes cleanse with normal saline, prepped with skin prep, apply folded 2 x 2 gauze with Medipore tape and change daily/as needed until site closes.  -Continue prolonged prednisone taper currently on 50 mg 3 times daily through 10/31.  Taper can be visualized in discharge paperwork and care everywhere.  -Bactrim for PCP prophylaxis  -Continue pyridostigmine 60 mg every 6 hours    #Type 2 diabetes  Continue PTA insulin including glargine 12 units every night and sliding scale    #Hypertension  Continue metoprolol 25 mg twice daily        Social: Was headed to a nursing home  Code: Discussed with patient and they have chosen full code  VTE prophylaxis: PCDs  Disposition: Observation                    Chief Complaint:   Placement concern         History of Present Illness:   Fer Minor is a 71 year old male who presents by EMS after he arrived at his long-term care facility without a BiPAP being available for sleeping.  Due to lack of this he was sent to the emergency room.    His history is quite complex beginning on September 9 after presenting to the emergency room with shortness of breath and hypoxia.  In addition to this he had lower extremity weakness and dysphagia.  Imaging showed bilateral diaphragmatic paralysis and he was intubated on September 9 for respiratory distress.  It was suspected that he had either Guillain-Barré, post viral neuropathy or myasthenia gravis.  He received  plasmapheresis September 11-16 and started on Mestinon.  Neurology was consulted recommending high-dose steroids for 30 days followed by slow taper with continuation of pyridostigmine and glycopyrrolate.  He was extubated on September 20 but required BiPAP that evening with continued work of breathing and eventually a tracheostomy was placed on September 22.  GJ tube was placed on September 29.  His admission was complicated by positive blood cultures on 9/15 for MSSA with initiation on vancomycin eventually transition to Ancef.  AMANDA on 9/27 did not show vegetations and he received IV cefazolin for 2 weeks ending on 10/4.  He was discharged to a rehab facility on 10/1 and decannulated on 10/26.     He was transported to long term facility today but unfortunately upon arrival they realized he did not have BiPAP available for that evening. He reports everyone seemed confused and he knew he needed that so was sent back to ED. Apparently, now they have the BiPAP at facility but his daughter is refusing to send him back there.             Past Medical History:   Acute hypoxemic respiratory failure  Suspected myasthenia gravis  Type 2 diabetes  Hypertension  Bacteremia            Past Surgical History:               Social History:     Social History     Socioeconomic History     Marital status:      Spouse name: Not on file     Number of children: Not on file     Years of education: Not on file     Highest education level: Not on file   Occupational History     Not on file   Tobacco Use     Smoking status: Not on file   Substance and Sexual Activity     Alcohol use: Not on file     Drug use: Not on file     Sexual activity: Not on file   Other Topics Concern     Not on file   Social History Narrative     Not on file     Social Determinants of Health     Financial Resource Strain:      Difficulty of Paying Living Expenses:    Food Insecurity:      Worried About Running Out of Food in the Last Year:      Ran Out of  Food in the Last Year:    Transportation Needs:      Lack of Transportation (Medical):      Lack of Transportation (Non-Medical):    Physical Activity:      Days of Exercise per Week:      Minutes of Exercise per Session:    Stress:      Feeling of Stress :    Social Connections:      Frequency of Communication with Friends and Family:      Frequency of Social Gatherings with Friends and Family:      Attends Shinto Services:      Active Member of Clubs or Organizations:      Attends Club or Organization Meetings:      Marital Status:    Intimate Partner Violence:      Fear of Current or Ex-Partner:      Emotionally Abused:      Physically Abused:      Sexually Abused:                Family History:   Family history reviewed and is non contributory         Allergies:    No Known Allergies            Medications:     Prior to Admission medications    Not on File              Review of Systems:   A Comprehensive greater than 10 system review of systems was carried out.  Pertinent positives and negatives are noted above.  Otherwise negative for contributory information.            Physical Exam:   Blood pressure 124/67, pulse 104, temperature 97.4  F (36.3  C), temperature source Oral, resp. rate 20, SpO2 96 %.  Exam:  GENERAL:  Comfortable.  PSYCH: pleasant, oriented, No acute distress.  HEENT:  PERRLA. Normal conjunctiva, normal hearing. Anterior neck has bandage where trach was placed.    HEART:  Tachycardic with no murmur, no pericardial rub, gallops or S3 or S4.  LUNGS:  Clear to auscultation, normal Respiratory effort. No wheezing, rales or ronchi.  ABDOMEN:  Soft, no hepatosplenomegaly, normal bowel sounds. Non-tender, non distended.   EXTREMITIES:  No pedal edema, +2 pulses bilateral and equal.  SKIN:  Dry to touch, No rash, wound or ulcerations.  NEUROLOGIC:  CN 2-12 grossly intact,  sensation is intact with no focal deficits.               Data:     Recent Labs   Lab 10/28/21  1901   WBC 7.2   HGB 14.4    HCT 44.6   *        Recent Labs   Lab 10/28/21  1901 10/28/21  1738     --    POTASSIUM 4.4  --    CHLORIDE 107  --    CO2 26  --    ANIONGAP 7  --    * 248*   BUN 18  --    CR 0.78  --    GFRESTIMATED 90  --    WILLIAM 8.8  --          No results found for this or any previous visit (from the past 24 hour(s)).      Latanya Blair PA-C

## 2021-10-29 NOTE — PLAN OF CARE
PRIMARY DIAGNOSIS: Placement needs - needs facility with bipap capability   OUTPATIENT/OBSERVATION GOALS TO BE MET BEFORE DISCHARGE:  1. ADLs back to baseline: Yes    2. Activity and level of assistance: Up with standby assistance.    3 Pain status:Denies     4. Return to near baseline physical activity: Yes    [facility] now not accepting pt, finding other placement. Pt tolerating oral food and pills. Stoma dressing changed, minimal drainage. G/J tube site C, D, I.     Discharge Planner Nurse   Safe discharge environment identified: Yes  Barriers to discharge: Yes       Entered by: Mike Jenkins 10/29/2021 2:46 PM     Please review provider order for any additional goals.   Nurse to notify provider when observation goals have been met and patient is ready for discharge.

## 2021-10-29 NOTE — ED NOTES
[facility]  ED Nurse Handoff Report    Fer Minor is a 71 year old male   ED Chief complaint: Shortness of Breath  . ED Diagnosis:   Final diagnoses:   Sleep apnea, unspecified type   Shortness of breath     Allergies: No Known Allergies    Code Status: Full Code  Activity level - Baseline/Home:  Stand by Assist. Activity Level - Current:   Assist X 1. Lift room needed: No. Bariatric: No   Needed: No   Isolation: No. Infection: Not Applicable.     Vital Signs:   Vitals:    10/28/21 1735 10/28/21 1900   BP: 114/87 124/67   Pulse: 85 104   Resp: 21 20   Temp: 97.4  F (36.3  C)    TempSrc: Oral    SpO2: 97% 96%       Cardiac Rhythm:  ,      Pain level:    Patient confused: No. Patient Falls Risk: Yes.   Elimination Status: Has voided   Patient Report - Initial Complaint: shortness of breath, needs bipap. Focused Assessment: 71 year old male with history of diaphragm paralysis, staphylococcus aureus bacteremia, GERD, diabetes type 2, who presents with EMS after being discharged from [facility]. He was to be transported to his nursing home but was told that they do not have any BiPAP. He notes that he is normally on BiPAP at night to sleep and needs this at night. He denies any shortness of breath.  He denies cough, fever, chills, chest pain, or new troubles breathing. No emesis, diarrhea, or new weakness. He reports that his tracheostomy tube was taken out 2 days ago.    Tests Performed: labs. Abnormal Results:   Abnormal Labs Resulted from Time of ED Arrival to Time of ED Departure   GLUCOSE BY METER - Abnormal       Result Value    GLUCOSE BY METER POCT 248 (*)    BASIC METABOLIC PANEL - Abnormal    Sodium 140      Potassium 4.4      Chloride 107      Carbon Dioxide (CO2) 26      Anion Gap 7      Urea Nitrogen 18      Creatinine 0.78      Calcium 8.8      Glucose 287 (*)     GFR Estimate 90     CBC WITH PLATELETS AND DIFFERENTIAL - Abnormal    WBC Count 7.2      RBC Count 4.40       Hemoglobin 14.4      Hematocrit 44.6       (*)     MCH 32.7      MCHC 32.3      RDW 13.2      Platelet Count 173      % Neutrophils 83      % Lymphocytes 9      % Monocytes 4      % Eosinophils 0      % Basophils 0      % Immature Granulocytes 4      NRBCs per 100 WBC 0      Absolute Neutrophils 5.9      Absolute Lymphocytes 0.7 (*)     Absolute Monocytes 0.3      Absolute Eosinophils 0.0      Absolute Basophils 0.0      Absolute Immature Granulocytes 0.3 (*)     Absolute NRBCs 0.0        Treatments provided: none  Family Comments: daughter, very involved  OBS brochure/video discussed/provided to patient:  Yes  ED Medications: Medications - No data to display  Drips infusing:  No  For the majority of the shift, the patient's behavior Green. Interventions performed were NA.    Sepsis treatment initiated: No     Patient tested for COVID 19 prior to admission: YES    ED Nurse Name/Phone Number: Bruna Nolasco RN,   9:58 PM  RECEIVING UNIT ED HANDOFF REVIEW    Above ED Nurse Handoff Report was reviewed: Yes  Reviewed by: Fadia Jefferson RN on October 28, 2021 at 11:33 PM

## 2021-10-29 NOTE — CONSULTS
Care Management Discharge Note    Discharge Date: 10/29/2021     Discharge Disposition:  Return to [facility] TCU    Discharge Services:  PT/OT    Discharge DME:  BiPap    Discharge Transportation:  [facility], $78.65 for base rate and $5.06 per mile to the destination. Spoke with daughter, they expressed understanding and are agreeable to this.     Private pay costs discussed: transportation costs    PAS Confirmation Code:  N/A--admitted from facility  Patient/family educated on Medicare website which has current facility and service quality ratings:  No--admitted from facility    Education Provided on the Discharge Plan:  Yes  Persons Notified of Discharge Plans: Patient, [facility] admissions  Patient/Family in Agreement with the Plan:  Yes    Handoff Referral Completed: No    Additional Information:  Patient admitted from [facility] after a miscommunication and patient not having BiPap at facility. Documentation states that daughter did not want patient discharging back to this facility. MD spoke with dtr this morning who is agreeable to pt returning to [facility]. Pt is also agreeable.     BILL left for Barbara in admissions at 845am, [phone]. Awaiting a return call. REINALDO called facility directly, was transferred to 1st floor who stated that patient left yesterday, and transferred SW to , BILL left requesting a return call. REINALDO placed another call to admissions at 10:15am. She states that patient was in TCU not LTC and is awaiting confirmation from facility that BiPap is there and that patient can return. Awaiting call back.     Discharge orders faxed. HE WC transport will be arranged. REINALDO will continue to follow.     HE WC transport tentatively scheduled for 12:15pm. Awaiting confirmation from facility.     `1130: Spoke with Barbara in admissions who reports that per , facility cannot meet patient's needs, and they  cannot accept patient back to facility. SW explained that pt's medical condition has not changed and they had clinically accepted patient yesterday and obtained insurance authorization through [employer]. Per admissions, was accepted by a different coordinator and she is just getting involved today. SW left another  for  Denise ([phone]) requesting a prompt call back. Case escalated to management. Called [facility] ([phone]),  left for SELIN Bravo who assisted with discharge planning to inquire re: who she worked with at [facility] in admissions and if there were any other accepting facilities for patient. Awaiting return calls. Notified MD-PT/OT consults ordered in anticipation of insurance auth needed. HE WC transport cancelled.     Additional TCU referrals sent.     1325: [facility] has clinically accepted patient, bed available today, [facility] is waiving prior auth. HE WC transport arranged for 1600 today. Discharge orders faxed.     Your information has been submitted on October 29th, 2021 at 01:57:33 PM CDT. The confirmation number is [id number]    Patient has been COVID vaccinated (Moderna 2/9 and 3/9). Patient is aware of a COVID case in TCU and LTC at [facility]. Patient will be in a private room d/t needing BiPap, no private room fee. Dtr update on discharge plans, provided contact info for [facility] and discussed essential caregiver visits.     OLMAN Dobson

## 2021-11-01 PROCEDURE — U0005 INFEC AGEN DETEC AMPLI PROBE: HCPCS | Performed by: PHYSICIAN ASSISTANT

## 2021-11-02 ENCOUNTER — LAB REQUISITION (OUTPATIENT)
Dept: LAB | Facility: CLINIC | Age: 71
End: 2021-11-02

## 2021-11-02 ENCOUNTER — LAB REQUISITION (OUTPATIENT)
Dept: LAB | Facility: CLINIC | Age: 71
End: 2021-11-02
Payer: COMMERCIAL

## 2021-11-02 ENCOUNTER — TRANSITIONAL CARE UNIT VISIT (OUTPATIENT)
Dept: GERIATRICS | Facility: CLINIC | Age: 71
End: 2021-11-02
Payer: COMMERCIAL

## 2021-11-02 VITALS
BODY MASS INDEX: 30.2 KG/M2 | RESPIRATION RATE: 16 BRPM | DIASTOLIC BLOOD PRESSURE: 75 MMHG | OXYGEN SATURATION: 95 % | HEIGHT: 72 IN | TEMPERATURE: 98 F | WEIGHT: 223 LBS | HEART RATE: 95 BPM | SYSTOLIC BLOOD PRESSURE: 116 MMHG

## 2021-11-02 DIAGNOSIS — Z93.1 GASTROSTOMY TUBE IN PLACE (H): ICD-10-CM

## 2021-11-02 DIAGNOSIS — R53.81 PHYSICAL DECONDITIONING: ICD-10-CM

## 2021-11-02 DIAGNOSIS — J98.6 DIAPHRAGMATIC PARALYSIS: Primary | ICD-10-CM

## 2021-11-02 DIAGNOSIS — Z11.1 ENCOUNTER FOR SCREENING FOR RESPIRATORY TUBERCULOSIS: ICD-10-CM

## 2021-11-02 DIAGNOSIS — K59.00 CONSTIPATION, UNSPECIFIED CONSTIPATION TYPE: ICD-10-CM

## 2021-11-02 DIAGNOSIS — I10 BENIGN ESSENTIAL HYPERTENSION: ICD-10-CM

## 2021-11-02 DIAGNOSIS — U07.1 COVID-19: ICD-10-CM

## 2021-11-02 DIAGNOSIS — E11.65 TYPE 2 DIABETES MELLITUS WITH HYPERGLYCEMIA, WITH LONG-TERM CURRENT USE OF INSULIN (H): ICD-10-CM

## 2021-11-02 DIAGNOSIS — J96.01 ACUTE HYPOXEMIC RESPIRATORY FAILURE (H): ICD-10-CM

## 2021-11-02 DIAGNOSIS — Z79.4 TYPE 2 DIABETES MELLITUS WITH HYPERGLYCEMIA, WITH LONG-TERM CURRENT USE OF INSULIN (H): ICD-10-CM

## 2021-11-02 DIAGNOSIS — R91.1 INCIDENTAL LUNG NODULE: ICD-10-CM

## 2021-11-02 DIAGNOSIS — E04.1 THYROID NODULE: ICD-10-CM

## 2021-11-02 PROCEDURE — 99310 SBSQ NF CARE HIGH MDM 45: CPT | Performed by: NURSE PRACTITIONER

## 2021-11-02 RX ORDER — INSULIN LISPRO 100 [IU]/ML
2 INJECTION, SOLUTION INTRAVENOUS; SUBCUTANEOUS
Start: 2021-11-02 | End: 2021-11-05

## 2021-11-02 ASSESSMENT — MIFFLIN-ST. JEOR: SCORE: 1794.52

## 2021-11-02 NOTE — LETTER
11/2/2021        RE: Fer Minor  [address]  [facility] [address]        [facility]    PRIMARY CARE PROVIDER AND CLINIC:  [facility], [address] [facility] [address]  Chief Complaint   Patient presents with     Hospital F/U      [place] Medical Record Number:  [id number]  Place of Service where encounter took place:  [facility]  ([facility]) [[id number]]    Fer Minor  is a 71 year old  (1950), admitted to the above facility from  [facility]. Hospital stay 10/28/21 through 10/29/21 (22 HOUR STAY)..   HPI:    PMH significant for HTN, type 2 DM, HERMES on CPAP. Had very complex recent hospitalization at [facility] with bilateral diaphragmatic paralysis, lower extremity weakness and dysphagia from 9/1-9/30/21. He was intubated during hospitalization and it was suspected that he had Guillain-[place], post viral neuropathy or myasthenia gravis. He received plasmapheresis 9/11-9/16 and was started on Mestinon. Neurology consulted and he is on high dose steroids x 30 days followed by slow taper of steroids. He is also to continue pyridostigmine and glycopyrrolate. Extubated 9/20 and eventually had tracheostomy placed 9/22. Also had GJ placed 9/29. During hospitalization had positive blood cultures that grew MSSA and was treated with vancomycin that changed to ancef. AMANDA from 9/27 showed no vegetation. Completed 2 week course of IV Ancef 10/4. He was discharged from the hospital to Acute rehab from 10/1-10/28/21.He was decannulated 10/26 and plan was for him to discharge to LTC facility. Patient required BiPAP for that evening and apparently facility did not have BiPAP when patient arrived so he was sent back to ED. Per ED notes apparently facility did have the BiPAP, but the patient's daughter refused to send him back there. He was then discharged to TCU at [facility], though hospital notes state that he was discharging to  [facility].     Fer seen today sitting up in his wheelchair. He tells me he would like to go home. Is wondering when he can go home. He had sleep study completed at [facility] on 10/31: phone [phone]. Patient tells me he still does not have the BiPAP at home that he needs to discharge and he is not sure when he will have it. He denies any trouble breathing or SOB. Denies cough. He is independent in his room and with ADLs. He has G tube that he tells me has not been flushed since admission to facility and he is no longer using it. Reports he is eating ok. Bowels moving ok. Denies dysuria or trouble voiding. Reports he was previously living at home with his wife.    CODE STATUS/ADVANCE DIRECTIVES DISCUSSION:  Full Code  CPR/Full code   ALLERGIES: No Known Allergies   PAST MEDICAL HISTORY: No past medical history on file.   PAST SURGICAL HISTORY:   has no past surgical history on file.  FAMILY HISTORY: family history is not on file.  SOCIAL HISTORY:     Patient's living condition: lives in a skilled nursing facility    Post Discharge Medication Reconciliation Status: discharge medications reconciled and changed, per note/orders  Current Outpatient Medications   Medication Sig     acetaminophen (TYLENOL) 325 MG tablet Take 650 mg by mouth every 8 hours as needed     aspirin (ASA) 81 MG chewable tablet 81 mg by Tube route At Bedtime     calcium carbonate 500 mg, elemental, 1250 (500 Ca) MG tablet chewable 500 mg by Tube route every 6 hours as needed for heartburn     famotidine (PEPCID) 20 MG tablet 20 mg by Tube route 2 times daily     glucagon 1 MG kit Inject 1 mg into the muscle as needed For blood sugar <70 mg/dl     hydrOXYzine (ATARAX) 25 MG tablet 25 mg by Tube route every 6 hours as needed for anxiety     insulin glargine (LANTUS PEN) 100 UNIT/ML pen Inject 12 Units Subcutaneous At Bedtime     insulin glargine (LANTUS SOLOSTAR) 100 UNIT/ML pen Inject 18 Units Subcutaneous daily     insulin  lispro (HUMALOG KWIKPEN) 100 UNIT/ML (1 unit dial) KWIKPEN Inject 2 Units Subcutaneous 3 times daily (before meals) + Sliding scale     insulin lispro (HUMALOG VIAL) 100 UNIT/ML vial Inject 0-16 Units Subcutaneous 4 times daily Before meals and nightly     melatonin 3 MG tablet 6 mg by Per G Tube route At Bedtime     metoprolol tartrate (LOPRESSOR) 25 MG tablet 25 mg by Tube route 2 times daily     oxyCODONE (ROXICODONE) 5 MG tablet 1 tablet (5 mg) by Oral or Feeding Tube route every 8 hours as needed for moderate to severe pain For moderate pain for up to 7 days. Max Daily Amount: 15 mg.     polyethylene glycol (MIRALAX) 17 GM/Dose powder 17 g by Tube route daily     [START ON 11/21/2021] predniSONE (DELTASONE) 10 MG tablet Take 3 tablets (30 mg) by mouth daily for 7 days For 7 doses (Patient taking differently: Take 30 mg by mouth daily For 7 doses: 11/21-11/27/21)     [START ON 12/19/2021] predniSONE (DELTASONE) 10 MG tablet Take 1 tablet (10 mg) by mouth daily for 7 days For 7 days (Patient taking differently: Take 10 mg by mouth daily For 7 days: 12/19/2021- 12/25/2021)     [START ON 11/7/2021] predniSONE (DELTASONE) 20 MG tablet Take 2 tablets (40 mg) by mouth daily for 7 days (Patient taking differently: Take 40 mg by mouth daily From 11/07/2021 to 11/13/2021)     [START ON 12/5/2021] predniSONE (DELTASONE) 20 MG tablet Take 1 tablet (20 mg) by mouth daily for 7 days For 7 days (Patient taking differently: Take 20 mg by mouth daily For 7 days: 12/05/2021 to 12/11/2021)     predniSONE (DELTASONE) 5 MG tablet Take 9 tablets (45 mg) by mouth daily for 7 days For 7 doses. (Patient taking differently: Take 45 mg by mouth daily For 7 doses: 10/31 to 11/6)     [START ON 11/14/2021] predniSONE (DELTASONE) 5 MG tablet Take 7 tablets (35 mg) by mouth daily for 7 days For 7 doses (Patient taking differently: Take 35 mg by mouth daily For 7 doses: 11/14/2021 to 11/20/2021)     [START ON 11/28/2021] predniSONE (DELTASONE)  5 MG tablet Take 5 tablets (25 mg) by mouth daily for 7 days For 7 doses (Patient taking differently: Take 25 mg by mouth daily For 7 doses: 11/28/2021 to 12/04/2021)     [START ON 12/12/2021] predniSONE (DELTASONE) 5 MG tablet Take 3 tablets (15 mg) by mouth daily for 7 days For 7 days (Patient taking differently: Take 15 mg by mouth daily For 7 days: 12/12/2021 to 12/18/2021)     [START ON 12/26/2021] predniSONE (DELTASONE) 5 MG tablet Take 1 tablet (5 mg) by mouth daily for 7 days For 7 days (Patient taking differently: Take 5 mg by mouth daily For 7 days: 12/26/2021 to 01/01/2022)     pyridostigmine (MESTINON) 60 MG tablet 60 mg by Tube route every 6 hours     senna (SENOKOT) 8.6 MG tablet Take 17.2 mg by mouth nightly as needed     sulfamethoxazole-trimethoprim (BACTRIM DS) 800-160 MG tablet 1 tablet by Tube route three times a week     No current facility-administered medications for this visit.       ROS:  10 point ROS of systems including Constitutional, Eyes, Respiratory, Cardiovascular, Gastroenterology, Genitourinary, Integumentary, Musculoskeletal, Psychiatric were all negative except for pertinent positives noted in my HPI.    Vitals:  /75   Pulse 95   Temp 98  F (36.7  C)   Resp 16   Ht 1.829 m (6')   Wt 101.2 kg (223 lb)   SpO2 95%   BMI 30.24 kg/m    Exam:  GENERAL APPEARANCE:  Alert, in NAD  HEENT: normocephalic, moist mucous membranes, nose without drainage or crusting, bandage to neck that is C/D/I  RESP:  respiratory effort normal, no respiratory distress, Lung sounds clear, patient is on RA  CV: auscultation of heart done, rate and rhythm regular.   ABDOMEN: + bowel sounds, soft, nontender, no grimacing or guarding with palpation. G tube in place  M/S: no lower extremity edema  SKIN:  Inspection and palpation of skin and subcutaneous tissue: skin warm, dry without rashes  NEURO moves extremities freeely  PSYCH: affect and mood normal      Lab/Diagnostic data:  Labs done in SNF are  in [facility]. Please refer to them using [facility]/Care Everywhere. and Recent labs in [facility] reviewed by me today.     ASSESSMENT/PLAN:  (J98.6) Diaphragmatic paralysis  (primary encounter diagnosis)  (J96.01) Acute hypoxemic respiratory failure (H)  Comment: no SOB, cough, oxygen saturations while awake in 90% on RA  -with prolonged hospitalization as above followed by acute rehab stay for possible Guillain-[place], post viral neuropathy or myasthenia gravis   -requires BiPAP for sleep and apparently the facility he was initially discharged to after acute rehab did not have BiPAP machine, so was sent to ED and then eventually to [facility]  -Had sleep study completed 10/31 at [facility] [phone].  Plan: Spoke to sleep clinic and they are unable to give estimation as to when patient will have BiPAP. The test has yet to be read by pulmonologist. I also spoke to pulmonology clinic- Dr. Gerald Benito's office at [phone] and they reported they don't have a timeline for this either- suspect it will be a couple weeks. I asked  to follow up with sleep clinic later this week to see if timeline is known at that point. Continue prednisone taper as ordered and pyridostigmine. He is also on bactrim for PCP prophy. He should follow up with neurology per their recommendations. Continue tylenol and oxycodone PRN for pain management.    (E11.65,  Z79.4) Type 2 diabetes mellitus with hyperglycemia, with long-term current use of insulin (H)  Comment: BS uncontrolled- AM fasting is <200, but BS the rest of day are in 200-300s- likely secondary to prednisone  a1c from 6/16/21 from care everywhere 7.3, unable to find more current a1c in care everywhere  -was previously on metformin and glipizide that were held during hospitalization/ rehab and was started on lantus  Plan: Continue lantus 18 units in AM daily and 12 units at bedtime. Start lispro 2 units scheduled with meals + sliding scale coverage. Carb  consistent diet. BS checks QID.     (I10) Benign essential hypertension  Comment: controlled, 109/69, 116/75, 114/73; HR 63, 95, 73  Plan: Continue metoprolol 25 mg BID. VS per policy. Adjust medications as needed.    (Z93.1) G tube in place  Comment: is no longer using this  -GJ tube that was placed 9/29  Plan: Discussed with nursing staff and asked for flush orders and dressing orders to be added. He should follow up with PCP after discharge to discuss removal.     (E04.1) Thyroid nodule  (R91.1) Incidental lung nodule  Comment: incidental finding noted on CT during acute hospitalization  Plan: Follow up with PCP after discharge from TCU. Will need follow up CT in 3 months for follow up on lung nodule and US for further evaluation of thyroid nodule.    (K59.00) Constipation, unspecified constipation type  Comment: reports bowels moving regularly  Plan: Continue miralax daily, senna at bedtime PRN.    (R53.81) Physical deconditioning  Comment: Acute, secondary to recent hospitalization, medical conditions as above  Plan: Encourage participation in physical therapy/occupational therapy for strengthening and deconditioning. Discharge planning per their recommendation. Social work to assist with d/c planning.      Total time spent with patient visit at the skilled nursing facility was 40 minutes including patient visit, review of past records and phone call to sleep clinic and pulmonology clinic, as well as discussion with . Greater than 50% of total time spent with counseling and coordinating care with facility staff including admission and medication orders, plan of care, including obtaining BiPAP for discharge as described above. Counseling patient: current medications, plan of care, potential discharge plan and discussion regarding sleep study and bipap.     Electronically signed by:  MARIALUISA Garcia CNP                       Sincerely,        MARIALUISA Garcia CNP

## 2021-11-02 NOTE — PROGRESS NOTES
[facility] GERIATRIC SERVICES    PRIMARY CARE PROVIDER AND CLINIC:  [facility], [address] [facility] [address]  Chief Complaint   Patient presents with     Hospital F/U      [place] Medical Record Number:  [id number]  Place of Service where encounter took place:  [facility]  ([facility]) [[id number]]    Fer Minor  is a 71 year old  (1950), admitted to the above facility from  [facility]. Hospital stay 10/28/21 through 10/29/21 (22 HOUR STAY)..   HPI:    PMH significant for HTN, type 2 DM, HERMES on CPAP. Had very complex recent hospitalization at [facility] with bilateral diaphragmatic paralysis, lower extremity weakness and dysphagia from 9/1-9/30/21. He was intubated during hospitalization and it was suspected that he had Guillain-[place], post viral neuropathy or myasthenia gravis. He received plasmapheresis 9/11-9/16 and was started on Mestinon. Neurology consulted and he is on high dose steroids x 30 days followed by slow taper of steroids. He is also to continue pyridostigmine and glycopyrrolate. Extubated 9/20 and eventually had tracheostomy placed 9/22. Also had GJ placed 9/29. During hospitalization had positive blood cultures that grew MSSA and was treated with vancomycin that changed to ancef. AMANDA from 9/27 showed no vegetation. Completed 2 week course of IV Ancef 10/4. He was discharged from the hospital to Acute rehab from 10/1-10/28/21.He was decannulated 10/26 and plan was for him to discharge to LTC facility. Patient required BiPAP for that evening and apparently facility did not have BiPAP when patient arrived so he was sent back to ED. Per ED notes apparently facility did have the BiPAP, but the patient's daughter refused to send him back there. He was then discharged to TCU at [facility], though hospital notes state that he was discharging to LTC.     Fer seen today sitting up in his wheelchair. He tells me he would like to go  home. Is wondering when he can go home. He had sleep study completed at [facility] on 10/31: phone [phone]. Patient tells me he still does not have the BiPAP at home that he needs to discharge and he is not sure when he will have it. He denies any trouble breathing or SOB. Denies cough. He is independent in his room and with ADLs. He has G tube that he tells me has not been flushed since admission to facility and he is no longer using it. Reports he is eating ok. Bowels moving ok. Denies dysuria or trouble voiding. Reports he was previously living at home with his wife.    CODE STATUS/ADVANCE DIRECTIVES DISCUSSION:  Full Code  CPR/Full code   ALLERGIES: No Known Allergies   PAST MEDICAL HISTORY: No past medical history on file.   PAST SURGICAL HISTORY:   has no past surgical history on file.  FAMILY HISTORY: family history is not on file.  SOCIAL HISTORY:     Patient's living condition: lives in a skilled nursing facility    Post Discharge Medication Reconciliation Status: discharge medications reconciled and changed, per note/orders  Current Outpatient Medications   Medication Sig     acetaminophen (TYLENOL) 325 MG tablet Take 650 mg by mouth every 8 hours as needed     aspirin (ASA) 81 MG chewable tablet 81 mg by Tube route At Bedtime     calcium carbonate 500 mg, elemental, 1250 (500 Ca) MG tablet chewable 500 mg by Tube route every 6 hours as needed for heartburn     famotidine (PEPCID) 20 MG tablet 20 mg by Tube route 2 times daily     glucagon 1 MG kit Inject 1 mg into the muscle as needed For blood sugar <70 mg/dl     hydrOXYzine (ATARAX) 25 MG tablet 25 mg by Tube route every 6 hours as needed for anxiety     insulin glargine (LANTUS PEN) 100 UNIT/ML pen Inject 12 Units Subcutaneous At Bedtime     insulin glargine (LANTUS SOLOSTAR) 100 UNIT/ML pen Inject 18 Units Subcutaneous daily     insulin lispro (HUMALOG KWIKPEN) 100 UNIT/ML (1 unit dial) KWIKPEN Inject 2 Units Subcutaneous 3 times  daily (before meals) + Sliding scale     insulin lispro (HUMALOG VIAL) 100 UNIT/ML vial Inject 0-16 Units Subcutaneous 4 times daily Before meals and nightly     melatonin 3 MG tablet 6 mg by Per G Tube route At Bedtime     metoprolol tartrate (LOPRESSOR) 25 MG tablet 25 mg by Tube route 2 times daily     oxyCODONE (ROXICODONE) 5 MG tablet 1 tablet (5 mg) by Oral or Feeding Tube route every 8 hours as needed for moderate to severe pain For moderate pain for up to 7 days. Max Daily Amount: 15 mg.     polyethylene glycol (MIRALAX) 17 GM/Dose powder 17 g by Tube route daily     [START ON 11/21/2021] predniSONE (DELTASONE) 10 MG tablet Take 3 tablets (30 mg) by mouth daily for 7 days For 7 doses (Patient taking differently: Take 30 mg by mouth daily For 7 doses: 11/21-11/27/21)     [START ON 12/19/2021] predniSONE (DELTASONE) 10 MG tablet Take 1 tablet (10 mg) by mouth daily for 7 days For 7 days (Patient taking differently: Take 10 mg by mouth daily For 7 days: 12/19/2021- 12/25/2021)     [START ON 11/7/2021] predniSONE (DELTASONE) 20 MG tablet Take 2 tablets (40 mg) by mouth daily for 7 days (Patient taking differently: Take 40 mg by mouth daily From 11/07/2021 to 11/13/2021)     [START ON 12/5/2021] predniSONE (DELTASONE) 20 MG tablet Take 1 tablet (20 mg) by mouth daily for 7 days For 7 days (Patient taking differently: Take 20 mg by mouth daily For 7 days: 12/05/2021 to 12/11/2021)     predniSONE (DELTASONE) 5 MG tablet Take 9 tablets (45 mg) by mouth daily for 7 days For 7 doses. (Patient taking differently: Take 45 mg by mouth daily For 7 doses: 10/31 to 11/6)     [START ON 11/14/2021] predniSONE (DELTASONE) 5 MG tablet Take 7 tablets (35 mg) by mouth daily for 7 days For 7 doses (Patient taking differently: Take 35 mg by mouth daily For 7 doses: 11/14/2021 to 11/20/2021)     [START ON 11/28/2021] predniSONE (DELTASONE) 5 MG tablet Take 5 tablets (25 mg) by mouth daily for 7 days For 7 doses (Patient taking  differently: Take 25 mg by mouth daily For 7 doses: 11/28/2021 to 12/04/2021)     [START ON 12/12/2021] predniSONE (DELTASONE) 5 MG tablet Take 3 tablets (15 mg) by mouth daily for 7 days For 7 days (Patient taking differently: Take 15 mg by mouth daily For 7 days: 12/12/2021 to 12/18/2021)     [START ON 12/26/2021] predniSONE (DELTASONE) 5 MG tablet Take 1 tablet (5 mg) by mouth daily for 7 days For 7 days (Patient taking differently: Take 5 mg by mouth daily For 7 days: 12/26/2021 to 01/01/2022)     pyridostigmine (MESTINON) 60 MG tablet 60 mg by Tube route every 6 hours     senna (SENOKOT) 8.6 MG tablet Take 17.2 mg by mouth nightly as needed     sulfamethoxazole-trimethoprim (BACTRIM DS) 800-160 MG tablet 1 tablet by Tube route three times a week     No current facility-administered medications for this visit.       ROS:  10 point ROS of systems including Constitutional, Eyes, Respiratory, Cardiovascular, Gastroenterology, Genitourinary, Integumentary, Musculoskeletal, Psychiatric were all negative except for pertinent positives noted in my HPI.    Vitals:  /75   Pulse 95   Temp 98  F (36.7  C)   Resp 16   Ht 1.829 m (6')   Wt 101.2 kg (223 lb)   SpO2 95%   BMI 30.24 kg/m    Exam:  GENERAL APPEARANCE:  Alert, in NAD  HEENT: normocephalic, moist mucous membranes, nose without drainage or crusting, bandage to neck that is C/D/I  RESP:  respiratory effort normal, no respiratory distress, Lung sounds clear, patient is on RA  CV: auscultation of heart done, rate and rhythm regular.   ABDOMEN: + bowel sounds, soft, nontender, no grimacing or guarding with palpation. G tube in place  M/S: no lower extremity edema  SKIN:  Inspection and palpation of skin and subcutaneous tissue: skin warm, dry without rashes  NEURO moves extremities freeely  PSYCH: affect and mood normal      Lab/Diagnostic data:  Labs done in SNF are in [facility]. Please refer to them using [facility]Care Everywhere. and Recent labs in [facility]  reviewed by me today.     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  Plan: Spoke to sleep clinic and they are unable to give estimation as to when patient will have BiPAP. The test has yet to be read by pulmonologist. I also spoke to pulmonology clinic- Dr. Gerald Benito's office at [phone] and they reported they don't have a timeline for this either- suspect it will be a couple weeks. I asked  to follow up with sleep clinic later this week to see if timeline is known at that point. Continue prednisone taper as ordered and pyridostigmine. He is also on bactrim for PCP prophy. He should follow up with neurology per their recommendations. Continue tylenol and oxycodone PRN for pain management.    (E11.65,  Z79.4) Type 2 diabetes mellitus with hyperglycemia, with long-term current use of insulin (H)  Comment: BS uncontrolled- AM fasting is <200, but BS the rest of day are in 200-300s- likely secondary to prednisone  a1c from 6/16/21 from care everywhere 7.3, unable to find more current a1c in care everywhere  -was previously on metformin and glipizide that were held during hospitalization/ rehab and was started on lantus  Plan: Continue lantus 18 units in AM daily and 12 units at bedtime. Start lispro 2 units scheduled with meals + sliding scale coverage. Carb consistent diet. BS checks QID.     (I10) Benign essential hypertension  Comment: controlled,  109/69, 116/75, 114/73; HR 63, 95, 73  Plan: Continue metoprolol 25 mg BID. VS per policy. Adjust medications as needed.    (Z93.1) G tube in place  Comment: is no longer using this  -GJ tube that was placed 9/29  Plan: Discussed with nursing staff and asked for flush orders and dressing orders to be added. He should follow up with PCP after discharge to discuss removal.     (E04.1) Thyroid nodule  (R91.1) Incidental lung nodule  Comment: incidental finding noted on CT during acute hospitalization  Plan: Follow up with PCP after discharge from TCU. Will need follow up CT in 3 months for follow up on lung nodule and US for further evaluation of thyroid nodule.    (K59.00) Constipation, unspecified constipation type  Comment: reports bowels moving regularly  Plan: Continue miralax daily, senna at bedtime PRN.    (R53.81) Physical deconditioning  Comment: Acute, secondary to recent hospitalization, medical conditions as above  Plan: Encourage participation in physical therapy/occupational therapy for strengthening and deconditioning. Discharge planning per their recommendation. Social work to assist with d/c planning.      Total time spent with patient visit at the skilled nursing facility was 40 minutes including patient visit, review of past records and phone call to sleep clinic and pulmonology clinic, as well as discussion with . Greater than 50% of total time spent with counseling and coordinating care with facility staff including admission and medication orders, plan of care, including obtaining BiPAP for discharge as described above. Counseling patient: current medications, plan of care, potential discharge plan and discussion regarding sleep study and bipap.     Electronically signed by:  MARIALUISA Garcia CNP

## 2021-11-03 LAB — SARS-COV-2 RNA RESP QL NAA+PROBE: NEGATIVE

## 2021-11-03 PROCEDURE — P9604 ONE-WAY ALLOW PRORATED TRIP: HCPCS

## 2021-11-03 PROCEDURE — 86481 TB AG RESPONSE T-CELL SUSP: CPT

## 2021-11-04 ENCOUNTER — TELEPHONE (OUTPATIENT)
Dept: GERIATRICS | Facility: CLINIC | Age: 71
End: 2021-11-04

## 2021-11-04 LAB
GAMMA INTERFERON BACKGROUND BLD IA-ACNC: 0.2 IU/ML
M TB IFN-G BLD-IMP: NEGATIVE
M TB IFN-G CD4+ BCKGRND COR BLD-ACNC: 9.8 IU/ML
MITOGEN IGNF BCKGRD COR BLD-ACNC: 0.02 IU/ML
MITOGEN IGNF BCKGRD COR BLD-ACNC: 0.02 IU/ML
QUANTIFERON MITOGEN: 10 IU/ML
QUANTIFERON NIL TUBE: 0.2 IU/ML
QUANTIFERON TB1 TUBE: 0.22 IU/ML
QUANTIFERON TB2 TUBE: 0.22

## 2021-11-04 PROCEDURE — U0005 INFEC AGEN DETEC AMPLI PROBE: HCPCS

## 2021-11-05 ENCOUNTER — LAB REQUISITION (OUTPATIENT)
Dept: LAB | Facility: CLINIC | Age: 71
End: 2021-11-05

## 2021-11-05 DIAGNOSIS — J98.6 DIAPHRAGMATIC PARALYSIS: Primary | ICD-10-CM

## 2021-11-05 DIAGNOSIS — U07.1 COVID-19: ICD-10-CM

## 2021-11-05 DIAGNOSIS — E04.1 THYROID NODULE: ICD-10-CM

## 2021-11-05 DIAGNOSIS — J96.01 ACUTE HYPOXEMIC RESPIRATORY FAILURE (H): ICD-10-CM

## 2021-11-05 DIAGNOSIS — E11.65 TYPE 2 DIABETES MELLITUS WITH HYPERGLYCEMIA, WITH LONG-TERM CURRENT USE OF INSULIN (H): ICD-10-CM

## 2021-11-05 DIAGNOSIS — R91.1 INCIDENTAL LUNG NODULE: ICD-10-CM

## 2021-11-05 DIAGNOSIS — Z79.4 TYPE 2 DIABETES MELLITUS WITH HYPERGLYCEMIA, WITH LONG-TERM CURRENT USE OF INSULIN (H): ICD-10-CM

## 2021-11-05 DIAGNOSIS — I10 BENIGN ESSENTIAL HYPERTENSION: ICD-10-CM

## 2021-11-05 DIAGNOSIS — R53.81 PHYSICAL DECONDITIONING: ICD-10-CM

## 2021-11-05 RX ORDER — INSULIN LISPRO 100 [IU]/ML
2 INJECTION, SOLUTION INTRAVENOUS; SUBCUTANEOUS
Start: 2021-11-05 | End: 2021-11-05

## 2021-11-05 RX ORDER — INSULIN LISPRO 100 [IU]/ML
2 INJECTION, SOLUTION INTRAVENOUS; SUBCUTANEOUS
Start: 2021-11-05 | End: 2021-11-09

## 2021-11-05 NOTE — PROGRESS NOTES
Fer Minor  YOB: 1950                                 Discharge Date: 11/6/2021 potential discharge if he has bipap at home  PHYSICIAN's DISCHARGE SUMMARY / ORDER SHEET  1. Discharge to: home  2. Medications:      Current Outpatient Medications   Medication Sig Dispense Refill     acetaminophen (TYLENOL) 325 MG tablet Take 650 mg by mouth every 8 hours as needed       aspirin (ASA) 81 MG chewable tablet 81 mg by Tube route At Bedtime       calcium carbonate 500 mg, elemental, 1250 (500 Ca) MG tablet chewable 500 mg by Tube route every 6 hours as needed for heartburn       famotidine (PEPCID) 20 MG tablet 20 mg by Tube route 2 times daily       glucagon 1 MG kit Inject 1 mg into the muscle as needed For blood sugar <70 mg/dl       hydrOXYzine (ATARAX) 25 MG tablet 25 mg by Tube route every 6 hours as needed for anxiety       insulin glargine (LANTUS PEN) 100 UNIT/ML pen Inject 12 Units Subcutaneous At Bedtime       insulin glargine (LANTUS SOLOSTAR) 100 UNIT/ML pen Inject 18 Units Subcutaneous daily       insulin lispro (HUMALOG KWIKPEN) 100 UNIT/ML (1 unit dial) KWIKPEN Inject 2 Units Subcutaneous 3 times daily (before meals) + sliding scale       insulin lispro (HUMALOG VIAL) 100 UNIT/ML vial Inject Subcutaneous 3 times daily Before meals. Novolog TID before meals: BS <200 give   0 units -249  1 units -299  2 units -349  3 units -399  4 units +  5 units         melatonin 3 MG tablet 6 mg by Per G Tube route At Bedtime       metoprolol tartrate (LOPRESSOR) 25 MG tablet 25 mg by Tube route 2 times daily       polyethylene glycol (MIRALAX) 17 GM/Dose powder 17 g by Tube route daily       [START ON 11/21/2021] predniSONE (DELTASONE) 10 MG tablet Take 3 tablets (30 mg) by mouth daily for 7 days For 7 doses (Patient taking differently: Take 30 mg by mouth daily For 7 doses: 11/21-11/27/21)       [START ON 12/19/2021] predniSONE (DELTASONE) 10 MG tablet Take 1 tablet (10 mg)  by mouth daily for 7 days For 7 days (Patient taking differently: Take 10 mg by mouth daily For 7 days: 12/19/2021- 12/25/2021)       [START ON 11/7/2021] predniSONE (DELTASONE) 20 MG tablet Take 2 tablets (40 mg) by mouth daily for 7 days (Patient taking differently: Take 40 mg by mouth daily From 11/07/2021 to 11/13/2021)       [START ON 12/5/2021] predniSONE (DELTASONE) 20 MG tablet Take 1 tablet (20 mg) by mouth daily for 7 days For 7 days (Patient taking differently: Take 20 mg by mouth daily For 7 days: 12/05/2021 to 12/11/2021)       predniSONE (DELTASONE) 5 MG tablet Take 9 tablets (45 mg) by mouth daily for 7 days For 7 doses. (Patient taking differently: Take 45 mg by mouth daily For 7 doses: 10/31 to 11/6)       [START ON 11/14/2021] predniSONE (DELTASONE) 5 MG tablet Take 7 tablets (35 mg) by mouth daily for 7 days For 7 doses (Patient taking differently: Take 35 mg by mouth daily For 7 doses: 11/14/2021 to 11/20/2021)       [START ON 11/28/2021] predniSONE (DELTASONE) 5 MG tablet Take 5 tablets (25 mg) by mouth daily for 7 days For 7 doses (Patient taking differently: Take 25 mg by mouth daily For 7 doses: 11/28/2021 to 12/04/2021)       [START ON 12/12/2021] predniSONE (DELTASONE) 5 MG tablet Take 3 tablets (15 mg) by mouth daily for 7 days For 7 days (Patient taking differently: Take 15 mg by mouth daily For 7 days: 12/12/2021 to 12/18/2021)       [START ON 12/26/2021] predniSONE (DELTASONE) 5 MG tablet Take 1 tablet (5 mg) by mouth daily for 7 days For 7 days (Patient taking differently: Take 5 mg by mouth daily For 7 days: 12/26/2021 to 01/01/2022)       pyridostigmine (MESTINON) 60 MG tablet 60 mg by Tube route every 6 hours       senna (SENOKOT) 8.6 MG tablet Take 17.2 mg by mouth nightly as needed       sulfamethoxazole-trimethoprim (BACTRIM DS) 800-160 MG tablet 1 tablet by Tube route three times a week          MEDICAL FOLLOW UP  Follow up with PCP in 1 week  Follow up with Specialists:  neurology and speech specialist per their recommendations    Home Care:  Occupational Therapy, Physical Therapy, Registered Nurse and From:  [facility]    ADDITIONAL INSTRUCTIONS:  - Monitor blood glucose monitoring 4 times a day. Keep a record and bring it to your next primary provider visit. Contact PCP if BS less than 100, or if BS are regularly >200  - Continue to follow your diet:  Carbohydrate Controlled Diet.  - BiPAP when sleeping; settings per usual practice- setting in TCU (AVAPS mode with last setting RR 16, , EPAP 8, FiO2 30%)  OR as instructed by sleep clinic  -Continue stoma care daily and PRN until stoma closes: cleanse with normal saline, apply skin prep, apply folded guaze 2x2 with medipore tape  -Gtube site dressing to be changed daily and PRN  -Administer 150 ml tap water via G tube every 8 hours to keep  G tube patent. Keep head of bed elevated at least 30 degrees with flushes.       Discharge patient to home with current medications and treatments  Discharge Home with Home care as above  - Nursing call in 30 days supply of needed meds to pharmacy of choice upon discharge  - please send home with original of these discharge instructions and copy for chart.       ______________________________  MARIALUISA Garcia [facility]   Department of [facility]                   11/5/2021

## 2021-11-05 NOTE — CONFIDENTIAL NOTE
Patient possibly discharging home 11/6 if bipap is available.  is working on this. Discharge orders and home care orders placed in preparation for possible discharge home.  MARIALUISA Garcia CNP on 11/5/2021 at 1:44 PM

## 2021-11-06 LAB — SARS-COV-2 RNA RESP QL NAA+PROBE: NEGATIVE

## 2021-11-09 ENCOUNTER — LAB (OUTPATIENT)
Dept: LAB | Facility: CLINIC | Age: 71
End: 2021-11-09
Payer: COMMERCIAL

## 2021-11-09 ENCOUNTER — TRANSITIONAL CARE UNIT VISIT (OUTPATIENT)
Dept: GERIATRICS | Facility: CLINIC | Age: 71
End: 2021-11-09
Payer: COMMERCIAL

## 2021-11-09 VITALS
TEMPERATURE: 97.3 F | OXYGEN SATURATION: 98 % | RESPIRATION RATE: 18 BRPM | SYSTOLIC BLOOD PRESSURE: 117 MMHG | HEIGHT: 74 IN | BODY MASS INDEX: 28.68 KG/M2 | DIASTOLIC BLOOD PRESSURE: 80 MMHG | WEIGHT: 223.5 LBS | HEART RATE: 74 BPM

## 2021-11-09 DIAGNOSIS — Z93.1 GASTROSTOMY TUBE IN PLACE (H): ICD-10-CM

## 2021-11-09 DIAGNOSIS — J98.6 DIAPHRAGMATIC PARALYSIS: Primary | ICD-10-CM

## 2021-11-09 DIAGNOSIS — R53.81 PHYSICAL DECONDITIONING: ICD-10-CM

## 2021-11-09 DIAGNOSIS — J96.01 ACUTE HYPOXEMIC RESPIRATORY FAILURE (H): ICD-10-CM

## 2021-11-09 DIAGNOSIS — K59.00 CONSTIPATION, UNSPECIFIED CONSTIPATION TYPE: ICD-10-CM

## 2021-11-09 DIAGNOSIS — R91.1 INCIDENTAL LUNG NODULE: ICD-10-CM

## 2021-11-09 DIAGNOSIS — E04.1 THYROID NODULE: ICD-10-CM

## 2021-11-09 DIAGNOSIS — I10 BENIGN ESSENTIAL HYPERTENSION: ICD-10-CM

## 2021-11-09 DIAGNOSIS — E11.65 TYPE 2 DIABETES MELLITUS WITH HYPERGLYCEMIA, WITH LONG-TERM CURRENT USE OF INSULIN (H): ICD-10-CM

## 2021-11-09 DIAGNOSIS — Z79.4 TYPE 2 DIABETES MELLITUS WITH HYPERGLYCEMIA, WITH LONG-TERM CURRENT USE OF INSULIN (H): ICD-10-CM

## 2021-11-09 LAB
BASE EXCESS BLDA CALC-SCNC: 2 MMOL/L (ref -9–1.8)
HCO3 BLD-SCNC: 26 MMOL/L (ref 21–28)
O2/TOTAL GAS SETTING VFR VENT: 21 %
OXYHGB MFR BLD: 97 % (ref 92–100)
PCO2 BLD: 40 MM HG (ref 35–45)
PH BLD: 7.43 [PH] (ref 7.35–7.45)
PO2 BLD: 88 MM HG (ref 80–105)

## 2021-11-09 PROCEDURE — 99309 SBSQ NF CARE MODERATE MDM 30: CPT | Performed by: NURSE PRACTITIONER

## 2021-11-09 PROCEDURE — 82805 BLOOD GASES W/O2 SATURATION: CPT

## 2021-11-09 RX ORDER — INSULIN LISPRO 100 [IU]/ML
5 INJECTION, SOLUTION INTRAVENOUS; SUBCUTANEOUS
Start: 2021-11-09

## 2021-11-09 ASSESSMENT — MIFFLIN-ST. JEOR: SCORE: 1828.54

## 2021-11-09 NOTE — PROGRESS NOTES
[facility] GERIATRIC SERVICES    Chief Complaint   Patient presents with     RECHECK     HPI:  Kenny Minor is a 71 year old  (1950), who is being seen today for an episodic care visit at: [facility]  ([facility]) [[id number]].     PMH significant for HTN, type 2 DM, HERMES on CPAP. Had very complex recent hospitalization at [facility] with bilateral diaphragmatic paralysis, lower extremity weakness and dysphagia from 9/1-9/30/21. He was intubated during hospitalization and it was suspected that he had Guillain-[place], post viral neuropathy or myasthenia gravis. He received plasmapheresis 9/11-9/16 and was started on Mestinon. Neurology consulted and he is on high dose steroids x 30 days followed by slow taper of steroids. He is also to continue pyridostigmine and glycopyrrolate. Extubated 9/20 and eventually had tracheostomy placed 9/22. Also had GJ placed 9/29. During hospitalization had positive blood cultures that grew MSSA and was treated with vancomycin that changed to ancef. AMANDA from 9/27 showed no vegetation. Completed 2 week course of IV Ancef 10/4. He was discharged from the hospital to Acute rehab from 10/1-10/28/21.He was decannulated 10/26 and plan was for him to discharge to LTC facility. Patient required BiPAP for that evening and apparently facility did not have BiPAP when patient arrived so he was sent back to ED. Per ED notes apparently facility did have the BiPAP, but the patient's daughter refused to send him back there. He was then discharged to TCU at [facility], though hospital notes state that he was discharging to LTC.       Today's concern is: kenny seen for routine follow up today. Unfortunately his therapy ended last week, but he still needs bipap in order to discharge home.  has been working with sleep clinic/ pulmonary clinic. First available appointment with Dr. Chung, pulmonologist is not until end of december.  and I spoke to nurse in his  "office today and explained the urgency of getting an appointment with him sooner to have this bipap ordered, so that he can discharge home. Plan as of today, was for patient to have blood gas drawn at [facility] for his review and nurse plans to follow up with Dr. Chung to see if he can be added onto his schedule sooner.     Fer today was very upset about not having bipap machine yet, and frustrated that he is having to stay at the facility when he does not require therapy. He reports he otherwise is doing fine and has no concerns with his health today    Allergies, and PMH/PSH reviewed in EPIC today.  REVIEW OF SYSTEMS:  4 point ROS including Respiratory, CV, GI and , other than that noted in the HPI,  is negative    Objective:   /80   Pulse 74   Temp 97.3  F (36.3  C)   Resp 18   Ht 1.88 m (6' 2\")   Wt 101.4 kg (223 lb 8 oz)   SpO2 98%   BMI 28.70 kg/m    GENERAL APPEARANCE:  Alert, in NAD  HEENT: normocephalic, moist mucous membranes, nose without drainage or crusting, bandage to neck that is C/D/I  RESP:  respiratory effort normal, no respiratory distress, patient is on RA  M/S: ambulates with 4WW  NEURO moves extremities freeely  PSYCH: affect and mood frustrated today    Labs done in SNF are in [facility]. Please refer to them using [employer]/Care Everywhere. and Recent labs in [facility] reviewed by me today.     Assessment/Plan:  (J98.6) Diaphragmatic paralysis  (primary encounter diagnosis)  (J96.01) Acute hypoxemic respiratory failure (H)  Comment: no SOB, cough, oxygen saturations while awake in 90s% on RA  -with prolonged hospitalization as above followed by acute rehab stay for possible Guillain-[place], post viral neuropathy or myasthenia gravis   -requires BiPAP for sleep and apparently the facility he was initially discharged to after acute rehab did not have BiPAP machine, so was sent to ED and then eventually to [facility]  -Had sleep study completed 10/31 at [facility] " [phone].  -oxycodone discontinued in TCU, as not used and patient declines pain  Plan: Patient awaiting appointment with pulmonary. Spoke to nurse today with  and they are trying to expedite his appointment which is currently not scheduled until end of December. Nurse reports he can have labs checked today at clinic to try to expedite the ordering of biPAP. She is working on trying to get him in sooner with Dr. Chung as well. Continue prednisone taper as ordered and pyridostigmine. He is also on bactrim for PCP prophy. He should follow up with neurology per their recommendations. Continue tylenol and oxycodone PRN for pain management.     (E11.65,  Z79.4) Type 2 diabetes mellitus with hyperglycemia, with long-term current use of insulin (H)  Comment: BS uncontrolled- AM fasting is at goal- though slightly low today at 88, but BS the rest of day are in 200-300s- likely secondary to prednisone  a1c from 6/16/21 from care everywhere 7.3, unable to find more current a1c in care everywhere  -was previously on metformin and glipizide that were held during hospitalization/ rehab and was started on lantus  Plan: Continue lantus 18 units in AM daily and reduce bedtime dose to 10 units at bedtime due to low this morning. Increase lispro 5 units scheduled with meals + sliding scale coverage. Carb consistent diet. BS checks QID.      (I10) Benign essential hypertension  Comment: controlled, 138/85, 117/80, 120/70; HR 75, 74, 94  Plan: Continue metoprolol 25 mg BID. VS per policy. Adjust medications as needed.     (Z93.1) G tube in place  Comment: is no longer using this  -GJ tube that was placed 9/29  Plan: Continue flush orders per nursing. He should follow up with PCP after discharge to discuss removal.      (E04.1) Thyroid nodule  (R91.1) Incidental lung nodule  Comment: incidental finding noted on CT during acute hospitalization  Plan: Follow up with PCP after discharge from TCU. Will need follow up CT in  3 months for follow up on lung nodule and US for further evaluation of thyroid nodule.     (K59.00) Constipation, unspecified constipation type  Comment: reports bowels moving regularly  Plan: Continue miralax daily, senna at bedtime PRN.     (R53.81) Physical deconditioning  Comment: Acute, secondary to recent hospitalization, medical conditions as above  -completed course of therapy at [facility]  -is now independent in room, no assist for ADLS, ambulating >150 feet with walker           Electronically signed by: MARIALUISA Garcia CNP

## 2021-11-09 NOTE — LETTER
11/9/2021        RE: Kenny Minor  [address] [place] [id number]        [facility]    Chief Complaint   Patient presents with     RECHECK     HPI:  Kenny Minor is a 71 year old  (1950), who is being seen today for an episodic care visit at: [facility]  ([facility]) [[id number]].     PMH significant for HTN, type 2 DM, HERMES on CPAP. Had very complex recent hospitalization at [facility] with bilateral diaphragmatic paralysis, lower extremity weakness and dysphagia from 9/1-9/30/21. He was intubated during hospitalization and it was suspected that he had Guillain-[place], post viral neuropathy or myasthenia gravis. He received plasmapheresis 9/11-9/16 and was started on Mestinon. Neurology consulted and he is on high dose steroids x 30 days followed by slow taper of steroids. He is also to continue pyridostigmine and glycopyrrolate. Extubated 9/20 and eventually had tracheostomy placed 9/22. Also had GJ placed 9/29. During hospitalization had positive blood cultures that grew MSSA and was treated with vancomycin that changed to ancef. AMANDA from 9/27 showed no vegetation. Completed 2 week course of IV Ancef 10/4. He was discharged from the hospital to Acute rehab from 10/1-10/28/21.He was decannulated 10/26 and plan was for him to discharge to LTC facility. Patient required BiPAP for that evening and apparently facility did not have BiPAP when patient arrived so he was sent back to ED. Per ED notes apparently facility did have the BiPAP, but the patient's daughter refused to send him back there. He was then discharged to TCU at [facility], though hospital notes state that he was discharging to LTC.       Today's concern is: kenny seen for routine follow up today. Unfortunately his therapy ended last week, but he still needs bipap in order to discharge home.  has been working with sleep clinic/ pulmonary clinic. First available appointment with Dr. Chung,  "pulmonologist is not until end of december.  and I spoke to nurse in his office today and explained the urgency of getting an appointment with him sooner to have this bipap ordered, so that he can discharge home. Plan as of today, was for patient to have blood gas drawn at [facility] for his review and nurse plans to follow up with Dr. Chung to see if he can be added onto his schedule sooner.     Fer today was very upset about not having bipap machine yet, and frustrated that he is having to stay at the facility when he does not require therapy. He reports he otherwise is doing fine and has no concerns with his health today    Allergies, and PMH/PSH reviewed in EPIC today.  REVIEW OF SYSTEMS:  4 point ROS including Respiratory, CV, GI and , other than that noted in the HPI,  is negative    Objective:   /80   Pulse 74   Temp 97.3  F (36.3  C)   Resp 18   Ht 1.88 m (6' 2\")   Wt 101.4 kg (223 lb 8 oz)   SpO2 98%   BMI 28.70 kg/m    GENERAL APPEARANCE:  Alert, in NAD  HEENT: normocephalic, moist mucous membranes, nose without drainage or crusting, bandage to neck that is C/D/I  RESP:  respiratory effort normal, no respiratory distress, patient is on RA  M/S: ambulates with 4WW  NEURO moves extremities freeely  PSYCH: affect and mood frustrated today    L[facility] in SNF [facility]. Please refer to them using [employer]/Care Everywhere. and Recent labs in EPIC reviewed by me today.     Assessment/Plan:  (J98.6) Diaphragmatic paralysis  (primary encounter diagnosis)  (J96.01) Acute hypoxemic respiratory failure (H)  Comment: no SOB, cough, oxygen saturations while awake in 90s% on RA  -with prolonged hospitalization as above followed by acute rehab stay for possible Guillain-[place], post viral neuropathy or myasthenia gravis   -requires BiPAP for sleep and apparently the facility he was initially discharged to after acute rehab did not have BiPAP machine, so was sent to ED and " then eventually to [facility]  -Had sleep study completed 10/31 at [facility] [phone].  -oxycodone discontinued in TCU, as not used and patient declines pain  Plan: Patient awaiting appointment with pulmonary. Spoke to nurse today with  and they are trying to expedite his appointment which is currently not scheduled until end of December. Nurse reports he can have labs checked today at clinic to try to expedite the ordering of biPAP. She is working on trying to get him in sooner with Dr. Chung as well. Continue prednisone taper as ordered and pyridostigmine. He is also on bactrim for PCP prophy. He should follow up with neurology per their recommendations. Continue tylenol and oxycodone PRN for pain management.     (E11.65,  Z79.4) Type 2 diabetes mellitus with hyperglycemia, with long-term current use of insulin (H)  Comment: BS uncontrolled- AM fasting is at goal- though slightly low today at 88, but BS the rest of day are in 200-300s- likely secondary to prednisone  a1c from 6/16/21 from care everywhere 7.3, unable to find more current a1c in care everywhere  -was previously on metformin and glipizide that were held during hospitalization/ rehab and was started on lantus  Plan: Continue lantus 18 units in AM daily and reduce bedtime dose to 10 units at bedtime due to low this morning. Increase lispro 5 units scheduled with meals + sliding scale coverage. Carb consistent diet. BS checks QID.      (I10) Benign essential hypertension  Comment: controlled, 138/85, 117/80, 120/70; HR 75, 74, 94  Plan: Continue metoprolol 25 mg BID. VS per policy. Adjust medications as needed.     (Z93.1) G tube in place  Comment: is no longer using this  -GJ tube that was placed 9/29  Plan: Continue flush orders per nursing. He should follow up with PCP after discharge to discuss removal.      (E04.1) Thyroid nodule  (R91.1) Incidental lung nodule  Comment: incidental finding noted on CT during acute  hospitalization  Plan: Follow up with PCP after discharge from TCU. Will need follow up CT in 3 months for follow up on lung nodule and US for further evaluation of thyroid nodule.     (K59.00) Constipation, unspecified constipation type  Comment: reports bowels moving regularly  Plan: Continue miralax daily, senna at bedtime PRN.     (R53.81) Physical deconditioning  Comment: Acute, secondary to recent hospitalization, medical conditions as above  -completed course of therapy at TCU  -is now independent in room, no assist for ADLS, ambulating >150 feet with walker           Electronically signed by: MARIALUISA Garcia CNP             Sincerely,        MARIALUISA Garcia CNP

## 2021-11-09 NOTE — PROGRESS NOTES
An ABG was completed on the pt's right radial artery @ 1645 on an FIO2 of 21%/room air with no complications noted during the procedure.    Any Santos, RT

## 2021-11-10 NOTE — PROGRESS NOTES
Updated by  that patient possibly discharging today or tomorrow. Paperwork completed as requested by [facility] for Non invasive trilogy ventilator. Facility requesting discharge orders.  Faxed to facility per their request.  MARIALUISA Garcia CNP on 11/10/2021 at 1:49 PM

## 2021-11-10 NOTE — CONFIDENTIAL NOTE
Fer Minor  YOB: 1950                                 Discharge Date: TBD once bipap is at patient's home    PHYSICIAN's DISCHARGE SUMMARY / ORDER SHEET  1. Discharge to: home  2. Medications:      Current Outpatient Medications   Medication Sig Dispense Refill     acetaminophen (TYLENOL) 325 MG tablet Take 650 mg by mouth every 8 hours as needed       aspirin (ASA) 81 MG chewable tablet 81 mg by Tube route At Bedtime       calcium carbonate 500 mg, elemental, 1250 (500 Ca) MG tablet chewable 500 mg by Tube route every 6 hours as needed for heartburn       famotidine (PEPCID) 20 MG tablet 20 mg by Tube route 2 times daily       glucagon 1 MG kit Inject 1 mg into the muscle as needed For blood sugar <70 mg/dl       hydrOXYzine (ATARAX) 25 MG tablet 25 mg by Tube route every 6 hours as needed for anxiety       insulin glargine (LANTUS PEN) 100 UNIT/ML pen Inject 10 Units Subcutaneous At Bedtime       insulin glargine (LANTUS SOLOSTAR) 100 UNIT/ML pen Inject 18 Units Subcutaneous daily       insulin lispro (HUMALOG KWIKPEN) 100 UNIT/ML (1 unit dial) KWIKPEN Inject 5 Units Subcutaneous 3 times daily (before meals) + sliding scale       insulin lispro (HUMALOG VIAL) 100 UNIT/ML vial Inject Subcutaneous 3 times daily Before meals. Novolog TID before meals: BS <200 give   0 units -249  1 units -299  2 units -349  3 units -399  4 units +  5 units         melatonin 3 MG tablet 6 mg by Per G Tube route At Bedtime       metoprolol tartrate (LOPRESSOR) 25 MG tablet 25 mg by Tube route 2 times daily       polyethylene glycol (MIRALAX) 17 GM/Dose powder 17 g by Tube route daily       [START ON 11/21/2021] predniSONE (DELTASONE) 10 MG tablet Take 3 tablets (30 mg) by mouth daily for 7 days For 7 doses (Patient taking differently: Take 30 mg by mouth daily For 7 doses: 11/21-11/27/21)       [START ON 12/19/2021] predniSONE (DELTASONE) 10 MG tablet Take 1 tablet (10 mg) by mouth daily  for 7 days For 7 days (Patient taking differently: Take 10 mg by mouth daily For 7 days: 12/19/2021- 12/25/2021)       predniSONE (DELTASONE) 20 MG tablet Take 2 tablets (40 mg) by mouth daily for 7 days (Patient taking differently: Take 40 mg by mouth daily From 11/07/2021 to 11/13/2021)       [START ON 12/5/2021] predniSONE (DELTASONE) 20 MG tablet Take 1 tablet (20 mg) by mouth daily for 7 days For 7 days (Patient taking differently: Take 20 mg by mouth daily For 7 days: 12/05/2021 to 12/11/2021)       [START ON 11/14/2021] predniSONE (DELTASONE) 5 MG tablet Take 7 tablets (35 mg) by mouth daily for 7 days For 7 doses (Patient taking differently: Take 35 mg by mouth daily For 7 doses: 11/14/2021 to 11/20/2021)       [START ON 11/28/2021] predniSONE (DELTASONE) 5 MG tablet Take 5 tablets (25 mg) by mouth daily for 7 days For 7 doses (Patient taking differently: Take 25 mg by mouth daily For 7 doses: 11/28/2021 to 12/04/2021)       [START ON 12/12/2021] predniSONE (DELTASONE) 5 MG tablet Take 3 tablets (15 mg) by mouth daily for 7 days For 7 days (Patient taking differently: Take 15 mg by mouth daily For 7 days: 12/12/2021 to 12/18/2021)       [START ON 12/26/2021] predniSONE (DELTASONE) 5 MG tablet Take 1 tablet (5 mg) by mouth daily for 7 days For 7 days (Patient taking differently: Take 5 mg by mouth daily For 7 days: 12/26/2021 to 01/01/2022)       pyridostigmine (MESTINON) 60 MG tablet 60 mg by Tube route every 6 hours       senna (SENOKOT) 8.6 MG tablet Take 17.2 mg by mouth nightly as needed       sulfamethoxazole-trimethoprim (BACTRIM DS) 800-160 MG tablet 1 tablet by Tube route three times a week         MEDICAL FOLLOW UP  Follow up with PCP in 1 week  Follow up with Specialists: neurology and sleep specialist per their recommendations     Home Care:  Occupational Therapy, Physical Therapy, Registered Nurse and From:  [facility]     ADDITIONAL INSTRUCTIONS:  - Monitor blood glucose monitoring 4  times a day. Keep a record and bring it to your next primary provider visit. Contact PCP if BS less than 100, or if BS are regularly >200  - Continue to follow your diet:  Carbohydrate Controlled Diet.  - BiPAP when sleeping; settings per sleep clinic  -Continue stoma care daily and PRN until stoma closes: cleanse with normal saline, apply skin prep, apply folded guaze 2x2 with medipore tape  -Gtube site dressing to be changed daily and PRN  -Administer 150 ml tap water via G tube every 8 hours to keep  G tube patent. Keep head of bed elevated at least 30 degrees with flushes.        Discharge patient to home with current medications and treatments  Discharge Home with Home care as above  - Nursing call in 30 days supply of needed meds to pharmacy of choice upon discharge  - please send home with original of these discharge instructions and copy for chart.       ______________________________  MARIALUISA Garcia [facility]                   11/10/2021

## 2021-11-11 ENCOUNTER — DISCHARGE SUMMARY NURSING HOME (OUTPATIENT)
Dept: GERIATRICS | Facility: CLINIC | Age: 71
End: 2021-11-11
Payer: COMMERCIAL

## 2021-11-11 VITALS
BODY MASS INDEX: 28.68 KG/M2 | SYSTOLIC BLOOD PRESSURE: 117 MMHG | WEIGHT: 223.5 LBS | HEART RATE: 80 BPM | HEIGHT: 74 IN | OXYGEN SATURATION: 95 % | RESPIRATION RATE: 18 BRPM | DIASTOLIC BLOOD PRESSURE: 74 MMHG | TEMPERATURE: 98 F

## 2021-11-11 DIAGNOSIS — E11.65 TYPE 2 DIABETES MELLITUS WITH HYPERGLYCEMIA, WITH LONG-TERM CURRENT USE OF INSULIN (H): ICD-10-CM

## 2021-11-11 DIAGNOSIS — I10 BENIGN ESSENTIAL HYPERTENSION: ICD-10-CM

## 2021-11-11 DIAGNOSIS — R91.1 INCIDENTAL LUNG NODULE: ICD-10-CM

## 2021-11-11 DIAGNOSIS — E04.1 THYROID NODULE: ICD-10-CM

## 2021-11-11 DIAGNOSIS — Z93.1 GASTROSTOMY TUBE IN PLACE (H): ICD-10-CM

## 2021-11-11 DIAGNOSIS — Z79.4 TYPE 2 DIABETES MELLITUS WITH HYPERGLYCEMIA, WITH LONG-TERM CURRENT USE OF INSULIN (H): ICD-10-CM

## 2021-11-11 DIAGNOSIS — K59.00 CONSTIPATION, UNSPECIFIED CONSTIPATION TYPE: ICD-10-CM

## 2021-11-11 DIAGNOSIS — J96.01 ACUTE HYPOXEMIC RESPIRATORY FAILURE (H): ICD-10-CM

## 2021-11-11 DIAGNOSIS — R53.81 PHYSICAL DECONDITIONING: ICD-10-CM

## 2021-11-11 DIAGNOSIS — J98.6 DIAPHRAGMATIC PARALYSIS: Primary | ICD-10-CM

## 2021-11-11 PROCEDURE — 99316 NF DSCHRG MGMT 30 MIN+: CPT | Performed by: NURSE PRACTITIONER

## 2021-11-11 ASSESSMENT — MIFFLIN-ST. JEOR: SCORE: 1828.54

## 2021-11-11 NOTE — PROGRESS NOTES
[facility] GERIATRIC SERVICES DISCHARGE SUMMARY  PATIENT'S NAME: Fer Minor  YOB: 1950  MEDICAL RECORD NUMBER:  [id number]  Place of Service where encounter took place:  [facility]  ([facility]) [[id number]]    PRIMARY CARE PROVIDER AND CLINIC RESPONSIBLE AFTER TRANSFER:   [facility], [address] [facility] [address]    Non-G Provider     Transferring providers: MARIALUISA Garcia CNP, Estevan Macias MD  Recent Hospitalization/ED:  [facility] stay 10/28/21 to 10/29/21.  Date of SNF Admission: October 29, 2021  Date of SNF (anticipated) Discharge: November 11, 2021  Discharged to: previous independent home  Cognitive Scores: SLUMS: 23/30  Physical Function: Ambulating >150 ft with 4WW; independent with ADLs      CODE STATUS/ADVANCE DIRECTIVES DISCUSSION:  Full Code   ALLERGIES: Patient has no known allergies.    NURSING FACILITY COURSE     PMH significant for HTN, type 2 DM, HERMES on CPAP.     Had very complex recent hospitalization at [facility] with bilateral diaphragmatic paralysis, lower extremity weakness and dysphagia from 9/1-9/30/21. He was intubated during hospitalization and it was suspected that he had Guillain-[place], post viral neuropathy or myasthenia gravis. He received plasmapheresis 9/11-9/16 and was started on Mestinon. Neurology consulted and he is on high dose steroids x 30 days followed by slow taper of steroids. He is also to continue pyridostigmine and glycopyrrolate. Extubated 9/20 and eventually had tracheostomy placed 9/22. Also had GJ placed 9/29. During hospitalization had positive blood cultures that grew MSSA and was treated with vancomycin that changed to ancef. AMANDA from 9/27 showed no vegetation. Completed 2 week course of IV Ancef 10/4. He was discharged from the hospital to Acute rehab from 10/1-10/28/21. He was decannulated 10/26 and plan was for him to discharge to LTC facility. Patient  required Trilogy for that evening and apparently facility did not have Trilogy when patient arrived so he was sent back to ED. Per ED notes apparently facility did have the Trilogy, but the patient's daughter refused to send him back there. He was then discharged to TCU at [facility], though hospital notes state that he was discharging to LTC.     Summary of nursing facility stay:   Fer met goals with therapy prior to discharge. His stay at TCU was extended secondary to awaiting bipap machine needed at home. While in TCU he had sleep study conducted and Trilogy was eventually ordered and set up at his house today. He should follow up with Dr. Chung, pulmonary per recommendations. Follow up with neurology per recommendations. Follow up with PCP in 1 week.     Specific problems addressed:   (J98.6) Diaphragmatic paralysis  (primary encounter diagnosis)  (J96.01) Acute hypoxemic respiratory failure (H)  Comment: no SOB, cough, oxygen saturations while awake in 90s% on RA  -with prolonged hospitalization as above followed by acute rehab stay for possible Guillain-[place], post viral neuropathy or myasthenia gravis   -requires Trilogy for sleep and apparently the facility he was initially discharged to after acute rehab did not have Trilogy machine, so was sent to ED and then eventually to [facility]  -Had sleep study completed 10/31 at [facility] [phone].  -oxycodone discontinued in TCU, as not used and patient declines pain  Plan: Trilogy being set up today at patient's home. Continue prednisone taper as ordered and pyridostigmine. He is also on bactrim for PCP prophy. He should follow up with neurology per their recommendations. Continue tylenol PRN for pain management.     (E11.65,  Z79.4) Type 2 diabetes mellitus with hyperglycemia, with long-term current use of insulin (H)  Comment: BS variable control- fasting BS at goal, with some elevated BS later in the day- likely secondary to prednisone  -a1c  from 6/16/21 from care everywhere 7.3, unable to find more current a1c in care everywhere  -was previously on metformin and glipizide that were held during hospitalization/ rehab and was started on lantus  -insulin dose increased 11/9, so did not adjust again today due to patient discharging home and different foods likely consumed at home  Plan: Continue lantus 18 units in AM daily and 10 units at bedtime. Continue lispro 5 units scheduled with meals + sliding scale coverage. Carb consistent diet. BS checks QID. Discussed in detail with patient today on parameters for calling provider, and expectation that his insulin will continue to need to be adjusted (decreased) as prednisone taper continues. He will need close management of BS throughout this time.      (I10) Benign essential hypertension  Comment: controlled, 117/74, 122/70, 126/70; HR 80, 83, 87  Plan: Continue metoprolol 25 mg BID. VS per policy. Adjust medications as needed.     (Z93.1) G tube in place  Comment: is no longer using this  -GJ tube that was placed 9/29  Plan: Continue flush orders per nursing. He should follow up with PCP after discharge to discuss removal.      (E04.1) Thyroid nodule  (R91.1) Incidental lung nodule  Comment: incidental finding noted on CT during acute hospitalization  Plan: Follow up with PCP after discharge from U. Will need follow up CT in 3 months for follow up on lung nodule and US for further evaluation of thyroid nodule.     (K59.00) Constipation, unspecified constipation type  Comment: reports bowels moving regularly  Plan: Continue miralax daily, senna at bedtime PRN.     (R53.81) Physical deconditioning  Comment: Acute, secondary to recent hospitalization, medical conditions as above  -completed course of [facility] at [facility]  -is now independent in room, no assist for ADLS, ambulating >150 feet with walker        Discharge Medications:  Current Outpatient Medications   Medication Sig Dispense Refill     acetaminophen  "(TYLENOL) 325 MG tablet Take 650 mg by mouth every 8 hours as needed       aspirin (ASA) 81 MG chewable tablet 81 mg by Tube route At Bedtime       calcium carbonate 500 mg, elemental, 1250 (500 Ca) MG tablet chewable 500 mg by Tube route every 6 hours as needed for heartburn       famotidine (PEPCID) 20 MG tablet 20 mg by Tube route 2 times daily       glucagon 1 MG kit Inject 1 mg into the muscle as needed For blood sugar <70 mg/dl       hydrOXYzine (ATARAX) 25 MG tablet 25 mg by Tube route every 6 hours as needed for anxiety       insulin glargine (LANTUS PEN) 100 UNIT/ML pen Inject 10 Units Subcutaneous At Bedtime (Patient not taking: Reported on 11/29/2021)       insulin glargine (LANTUS SOLOSTAR) 100 UNIT/ML pen Inject 28 Units Subcutaneous At Bedtime        insulin lispro (HUMALOG KWIKPEN) 100 UNIT/ML (1 unit dial) KWIKPEN Inject 5 Units Subcutaneous 3 times daily (before meals) + sliding scale       insulin lispro (HUMALOG VIAL) 100 UNIT/ML vial Inject Subcutaneous 3 times daily Before meals. Novolog TID before meals: BS <200 give   0 units -249  1 units -299  2 units -349  3 units -399  4 units +  5 units         melatonin 3 MG tablet 6 mg by Per G Tube route At Bedtime       metoprolol tartrate (LOPRESSOR) 25 MG tablet 25 mg by Tube route 2 times daily       polyethylene glycol (MIRALAX) 17 GM/Dose powder 17 g by Tube route daily       pyridostigmine (MESTINON) 60 MG tablet 60 mg by Tube route every 6 hours       senna (SENOKOT) 8.6 MG tablet Take 17.2 mg by mouth nightly as needed       sulfamethoxazole-trimethoprim (BACTRIM DS) 800-160 MG tablet 1 tablet by Tube route three times a week         Past Medical History: History reviewed. No pertinent past medical history.  Physical Exam:   Vitals: /74   Pulse 80   Temp 98  F (36.7  C)   Resp 18   Ht 1.88 m (6' 2\")   Wt 101.4 kg (223 lb 8 oz)   SpO2 95%   BMI 28.70 kg/m    BMI= Body mass index is 28.7 kg/m .  GENERAL " APPEARANCE:  Alert, in NAD  HEENT: normocephalic, moist mucous membranes, nose without drainage or crusting  RESP:  respiratory effort normal, no respiratory distress, patient is on RA  M/S:   Gait and station abnormal: uses 4WW for ambulation  NEURO:  Moves extremities freely  PSYCH: oriented x 3, affect and mood ok    SNF labs: Labs done in SNF are in [facility]. Please refer to them using [facility]/Care Everywhere. and Recent labs in [facility] reviewed by me today.     DISCHARGE PLAN:    Medical Follow Up:      Follow up with PCP in 1 week   Follow up with Specialists: neurology and sleep specialist per their recommendations  Discharge Services:     Home Care:  Occupational Therapy, Physical Therapy, Registered Nurse and From:  [facility]    Discharge Instructions  - Monitor blood glucose monitoring 4 times a day. Keep a record and bring it to your next primary provider visit. Contact PCP if BS less than 100, or if BS are regularly >200  - Continue to follow your diet:  Carbohydrate Controlled Diet.  - Trilogy when sleeping; settings per sleep clinic  -Continue stoma care daily and PRN until stoma closes: cleanse with normal saline, apply skin prep, apply folded guaze 2x2 with medipore tape  -Gtube site dressing to be changed daily and PRN  -Administer 150 ml tap water via G tube every 8 hours to keep  G tube patent. Keep head of bed elevated at least 30 degrees with flushes.     TOTAL DISCHARGE TIME:   Greater than 30 minutes  Electronically signed by:  MARIALUISA Garcia CNP     Home care Face to Face documentation done in [facility] attached to Home care orders for [facility]. (previously ordered)

## 2021-11-11 NOTE — LETTER
11/11/2021        RE: Fer Minor  [address]  [place] [address]        [facility] DISCHARGE [facility]  PATIENT'S NAME: Fer Minor  YOB: 1950  MEDICAL RECORD NUMBER:  [id number]  Place of Service where encounter took place:  [facility]  ([place] [facility]) [[id number]]    PRIMARY CARE PROVIDER AND CLINIC RESPONSIBLE AFTER TRANSFER:   [facility], [address] [facility] / [facility] [id number]    Non-G Provider     Transferring providers: MARIALUISA Garcia CNP, Estevan Macias MD  Recent Hospitalization/ED:  [facility] stay 10/28/21 to 10/29/21.  Date of SNF Admission: October 29, 2021  Date of SNF (anticipated) Discharge: November 11, 2021  Discharged to: previous independent home  Cognitive Scores: SLUMS: 23/30  Physical Function: Ambulating >150 ft with 4WW; independent with ADLs      CODE STATUS/ADVANCE DIRECTIVES DISCUSSION:  Full Code   ALLERGIES: Patient has no known allergies.    NURSING FACILITY COURSE     PMH significant for HTN, type 2 DM, HERMES on CPAP.     Had very complex recent hospitalization at [facility] with bilateral diaphragmatic paralysis, lower extremity weakness and dysphagia from 9/1-9/30/21. He was intubated during hospitalization and it was suspected that he had Guillain-Marian[place], post viral neuropathy or myasthenia gravis. He received plasmapheresis 9/11-9/16 and was started on Mestinon. Neurology consulted and he is on high dose steroids x 30 days followed by slow taper of steroids. He is also to continue pyridostigmine and glycopyrrolate. Extubated 9/20 and eventually had tracheostomy placed 9/22. Also had GJ placed 9/29. During hospitalization had positive blood cultures that grew MSSA and was treated with vancomycin that changed to ancef. AMANDA from 9/27 showed no vegetation. Completed 2 week course of IV Ancef 10/4. He was discharged from the hospital to Acute rehab from 10/1-10/28/21.  He was decannulated 10/26 and plan was for him to discharge to LTC facility. Patient required BiPAP for that evening and apparently facility did not have BiPAP when patient arrived so he was sent back to ED. Per ED notes apparently facility did have the BiPAP, but the patient's daughter refused to send him back there. He was then discharged to TCU at [facility], though hospital notes state that he was discharging to LTC.     Summary of nursing facility stay:   Fer met goals with therapy prior to discharge. His stay at TCU was extended secondary to awaiting bipap machine needed at home. While in TCU he had sleep study conducted and bipap was eventually ordered and set up at his house today. He should follow up with Dr. Chung, pulmonary per recommendations. Follow up with neurology per recommendations. Follow up with PCP in 1 week.     Specific problems addressed:   (J98.6) Diaphragmatic paralysis  (primary encounter diagnosis)  (J96.01) Acute hypoxemic respiratory failure (H)  Comment: no SOB, cough, oxygen saturations while awake in 90s% on RA  -with prolonged hospitalization as above followed by acute rehab stay for possible Guillain-Luverne, post viral neuropathy or myasthenia gravis   -requires BiPAP for sleep and apparently the facility he was initially discharged to after acute rehab did not have BiPAP machine, so was sent to ED and then eventually to [facility]  -Had sleep study completed 10/31 at [facility] [phone].  -oxycodone discontinued in TCU, as not used and patient declines pain  Plan: Bipap being set up today at patient's home. Continue prednisone taper as ordered and pyridostigmine. He is also on bactrim for PCP prophy. He should follow up with neurology per their recommendations. Continue tylenol PRN for pain management.     (E11.65,  Z79.4) Type 2 diabetes mellitus with hyperglycemia, with long-term current use of insulin (H)  Comment: BS variable control- fasting BS at goal, with  some elevated BS later in the day- likely secondary to prednisone  -a1c from 6/16/21 from care everywhere 7.3, unable to find more current a1c in care everywhere  -was previously on metformin and glipizide that were held during hospitalization/ rehab and was started on lantus  -insulin dose increased 11/9, so did not adjust again today due to patient discharging home and different foods likely consumed at home  Plan: Continue lantus 18 units in AM daily and 10 units at bedtime. Continue lispro 5 units scheduled with meals + sliding scale coverage. Carb consistent diet. BS checks QID. Discussed in detail with patient today on parameters for calling provider, and expectation that his insulin will continue to need to be adjusted (decreased) as prednisone taper continues. He will need close management of BS throughout this time.      (I10) Benign essential hypertension  Comment: controlled, 117/74, 122/70, 126/70; HR 80, 83, 87  Plan: Continue metoprolol 25 mg BID. VS per policy. Adjust medications as needed.     (Z93.1) G tube in place  Comment: is no longer using this  -GJ tube that was placed 9/29  Plan: Continue flush orders per nursing. He should follow up with PCP after discharge to discuss removal.      (E04.1) Thyroid nodule  (R91.1) Incidental lung nodule  Comment: incidental finding noted on CT during acute hospitalization  Plan: Follow up with PCP after discharge from TCU. Will need follow up CT in 3 months for follow up on lung nodule and US for further evaluation of thyroid nodule.     (K59.00) Constipation, unspecified constipation type  Comment: reports bowels moving regularly  Plan: Continue miralax daily, senna at bedtime PRN.     (R53.81) Physical deconditioning  Comment: Acute, secondary to recent hospitalization, medical conditions as above  -completed course of therapy at [facility]  -is now independent in room, no assist for ADLS, ambulating >150 feet with walker        Discharge Medications:  Current  Outpatient Medications   Medication Sig Dispense Refill     acetaminophen (TYLENOL) 325 MG tablet Take 650 mg by mouth every 8 hours as needed       aspirin (ASA) 81 MG chewable tablet 81 mg by Tube route At Bedtime       calcium carbonate 500 mg, elemental, 1250 (500 Ca) MG tablet chewable 500 mg by Tube route every 6 hours as needed for heartburn       famotidine (PEPCID) 20 MG tablet 20 mg by Tube route 2 times daily       glucagon 1 MG kit Inject 1 mg into the muscle as needed For blood sugar <70 mg/dl       hydrOXYzine (ATARAX) 25 MG tablet 25 mg by Tube route every 6 hours as needed for anxiety       insulin glargine (LANTUS PEN) 100 UNIT/ML pen Inject 10 Units Subcutaneous At Bedtime       insulin glargine (LANTUS SOLOSTAR) 100 UNIT/ML pen Inject 18 Units Subcutaneous daily       insulin lispro (HUMALOG KWIKPEN) 100 UNIT/ML (1 unit dial) KWIKPEN Inject 5 Units Subcutaneous 3 times daily (before meals) + sliding scale       insulin lispro (HUMALOG VIAL) 100 UNIT/ML vial Inject Subcutaneous 3 times daily Before meals. Novolog TID before meals: BS <200 give   0 units -249  1 units -299  2 units -349  3 units -399  4 units +  5 units         melatonin 3 MG tablet 6 mg by Per G Tube route At Bedtime       metoprolol tartrate (LOPRESSOR) 25 MG tablet 25 mg by Tube route 2 times daily       polyethylene glycol (MIRALAX) 17 GM/Dose powder 17 g by Tube route daily       [START ON 11/21/2021] predniSONE (DELTASONE) 10 MG tablet Take 3 tablets (30 mg) by mouth daily for 7 days For 7 doses (Patient taking differently: Take 30 mg by mouth daily For 7 doses: 11/21-11/27/21)       [START ON 12/19/2021] predniSONE (DELTASONE) 10 MG tablet Take 1 tablet (10 mg) by mouth daily for 7 days For 7 days (Patient taking differently: Take 10 mg by mouth daily For 7 days: 12/19/2021- 12/25/2021)       predniSONE (DELTASONE) 20 MG tablet Take 2 tablets (40 mg) by mouth daily for 7 days (Patient taking  "differently: Take 40 mg by mouth daily From 11/07/2021 to 11/13/2021)       [START ON 12/5/2021] predniSONE (DELTASONE) 20 MG tablet Take 1 tablet (20 mg) by mouth daily for 7 days For 7 days (Patient taking differently: Take 20 mg by mouth daily For 7 days: 12/05/2021 to 12/11/2021)       [START ON 11/14/2021] predniSONE (DELTASONE) 5 MG tablet Take 7 tablets (35 mg) by mouth daily for 7 days For 7 doses (Patient taking differently: Take 35 mg by mouth daily For 7 doses: 11/14/2021 to 11/20/2021)       [START ON 11/28/2021] predniSONE (DELTASONE) 5 MG tablet Take 5 tablets (25 mg) by mouth daily for 7 days For 7 doses (Patient taking differently: Take 25 mg by mouth daily For 7 doses: 11/28/2021 to 12/04/2021)       [START ON 12/12/2021] predniSONE (DELTASONE) 5 MG tablet Take 3 tablets (15 mg) by mouth daily for 7 days For 7 days (Patient taking differently: Take 15 mg by mouth daily For 7 days: 12/12/2021 to 12/18/2021)       [START ON 12/26/2021] predniSONE (DELTASONE) 5 MG tablet Take 1 tablet (5 mg) by mouth daily for 7 days For 7 days (Patient taking differently: Take 5 mg by mouth daily For 7 days: 12/26/2021 to 01/01/2022)       pyridostigmine (MESTINON) 60 MG tablet 60 mg by Tube route every 6 hours       senna (SENOKOT) 8.6 MG tablet Take 17.2 mg by mouth nightly as needed       sulfamethoxazole-trimethoprim (BACTRIM DS) 800-160 MG tablet 1 tablet by Tube route three times a week         Past Medical History: No past medical history on file.  Physical Exam:   Vitals: /74   Pulse 80   Temp 98  F (36.7  C)   Resp 18   Ht 1.88 m (6' 2\")   Wt 101.4 kg (223 lb 8 oz)   SpO2 95%   BMI 28.70 kg/m    BMI= Body mass index is 28.7 kg/m .  GENERAL APPEARANCE:  Alert, in NAD  HEENT: normocephalic, moist mucous membranes, nose without drainage or crusting  RESP:  respiratory effort normal, no respiratory distress, patient is on RA  M/S:   Gait and station abnormal: uses 4WW for ambulation  NEURO:  Moves " extremities freely  PSYCH: oriented x 3, affect and mood ok    SNF labs: Labs done in SNF are in [facility]. Please refer to them using EPIC/Care Everywhere. and Recent labs in [facility] reviewed by me today.     DISCHARGE PLAN:    Medical Follow Up:      Follow up with PCP in 1 week   Follow up with Specialists: neurology and sleep specialist per their recommendations  Discharge Services:     Home Care:  Occupational Therapy, Physical Therapy, Registered Nurse and From:  [facility]    Discharge Instructions  - Monitor blood glucose monitoring 4 times a day. Keep a record and bring it to your next primary provider visit. Contact PCP if BS less than 100, or if BS are regularly >200  - Continue to follow your diet:  Carbohydrate Controlled Diet.  - BiPAP when sleeping; settings per sleep clinic  -Continue stoma care daily and PRN until stoma closes: cleanse with normal saline, apply skin prep, apply folded guaze 2x2 with medipore tape  -Gtube site dressing to be changed daily and PRN  -Administer 150 ml tap water via G tube every 8 hours to keep  G tube patent. Keep head of bed elevated at least 30 degrees with flushes.     TOTAL DISCHARGE TIME:   Greater than 30 minutes  Electronically signed by:  MARIALUISA Garcia CNP     Home care Face to Face documentation done in [facility] attached to Home care orders for [facility]. (previously ordered)                  Sincerely,        MARIALUISA Garcia CNP

## 2021-11-29 ENCOUNTER — PRE VISIT (OUTPATIENT)
Dept: NEUROLOGY | Facility: CLINIC | Age: 71
End: 2021-11-29

## 2021-11-29 ENCOUNTER — OFFICE VISIT (OUTPATIENT)
Dept: NEUROLOGY | Facility: CLINIC | Age: 71
End: 2021-11-29
Attending: NEUROMUSCULOSKELETAL MEDICINE & OMM
Payer: COMMERCIAL

## 2021-11-29 VITALS
RESPIRATION RATE: 16 BRPM | SYSTOLIC BLOOD PRESSURE: 136 MMHG | DIASTOLIC BLOOD PRESSURE: 79 MMHG | HEART RATE: 109 BPM | OXYGEN SATURATION: 96 % | WEIGHT: 241 LBS | BODY MASS INDEX: 30.94 KG/M2

## 2021-11-29 DIAGNOSIS — G54.5 NEURALGIC AMYOTROPHY: ICD-10-CM

## 2021-11-29 DIAGNOSIS — G58.8 PHRENIC NEUROPATHY: Primary | ICD-10-CM

## 2021-11-29 PROCEDURE — 99204 OFFICE O/P NEW MOD 45 MIN: CPT | Performed by: PSYCHIATRY & NEUROLOGY

## 2021-11-29 ASSESSMENT — PAIN SCALES - GENERAL: PAINLEVEL: NO PAIN (0)

## 2021-11-29 NOTE — PATIENT INSTRUCTIONS
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  Both can occur after a virus like those causing diarrhea. They are autoimmune reactions- your own body attacks your own nerves.  This is unlikely to happen again in the future, and most patients will slowly recover on their own. Recovery of phrenic nerve damage however can be very long and can take more than 1 year. During that time you may need to continue using the BiPAP mask at night, and you should follow with a lung doctor locally.    Return in a few weeks for an EMG test with me to clarify what happened.    Re: the prednisone, you can take 20 mg daily for 1 week, then 10 mg daily for 1 week, then 5 mg daily for 1 week, then stop it.  Please stop the Bactrim antibiotic when your prednisone daily dose is less than 20 mg per day.  You can also stop the drug called pyridostigmine (Mestinon)- this was given because the doctors thought you may have myasthenia gravis- I don't believe so.

## 2021-11-29 NOTE — LETTER
2021       RE: Fer Chiang  [address] [place] [id number]     Dear Colleague,    Thank you for referring your patient, Fer Chiang, to the [facility] [place] [facility]. Please see a copy of my visit note below.    Service Date: 2021    MD Chintan Rodriguez [facility]  [address] [place]  [address]    RE:  Fer Chiang  MRN:  [id number]  :  1950    Dear Berkley:    I had the pleasure to see Mr. Chiang in the [facility] to establish care. He was escorted today by his wife and daughter.  His story is as follows. He was in good state of health until early 2021.  At that time, he had a non-bloody diarrheal illness for about 3 days.  He did not have a fever.  He has taken the COVID-19 Moderna vaccine, but his last dose was 2021.  A few days after the diarrhea resolved, he noticed severe orthopnea.  He could not lay flat in bed and he had major dyspnea on exertion.  This was completely new.  He went to a local urgent care and he was prescribed antibiotics for walking pneumonia.  Two days later he was checked up again in an urgent care because his symptoms were worsening and he was asked to have an outpatient evaluation with Pulmonology in 2 weeks.  He ultimately ended up in the [facility] where, a couple of days after his presentation, he ended up intubated.  Imaging including x-ray and CT showed bilateral diaphragmatic elevation consistent with paralysis and he had extremely low MIP and extremely low FEV1 and FVC values with normal ratio consistent with this diagnosis.  There was no other cardiac or pulmonary cause of his presentation.  Shortly before  he ended up in [place], he had a few episodes where his legs would give out, right more than left, and he would fall.  It should be noted, however, that he was able to  walk into [facility] and did not require a wheelchair.  He was also able to use his arms and raise them overhead until the day he was intubated and he was able to  things and did not lose his ability to brush his teeth, comb his hair, cut his food or do other activities with his arms while he experienced respiratory failure symptoms.  He now denies any dysphagia, dysarthria, dysphonia, sialorrhea, head drop, ptosis or diplopia.  It is my impression that he did not have any of those symptoms in early September either.  He did experience, however, a very severe sharp pain in the midline of the neck radiating towards both shoulders.  On the right arm, that pain and jolt like sensation was radiating from the shoulder down the mid forearm and digit 3 which continues to intermittently feel numb. Otherwise, he does not have any numbness or tingling in the hands or feet.      Initially, it was thought he could have Guillain-[place].  He had a spinal tap showing a protein that is actually normal for a 71-year-old with diabetes and was only 47 mg/dL.  There was no pleocytosis.  No other CSF abnormality.  He did not have an EMG. CK was within normal limits.  Acetylcholine receptor antibody binding, blocking modulating and MuSK antibodies for myasthenia were all negative.  He was treated empirically with plasmapheresis every other day x5 and he was also prescribed high dose oral prednisone starting at 50 mg with a gradual taper.  He is now on 25 mg daily and he was asked to taper by 5 mg every week.  He was also given pyridostigmine 60 mg t.i.d. and glycopyrrolate for sialorrhea as well as Bactrim for PCP prophylaxis.      Now he feels somewhat improved.  He underwent tracheostomy and gastrostomy at some point, but he was able to remove both tubes.  He still uses the BiPAP all night.  Otherwise, he cannot sleep.  He does not use it during the day.  He has an appointment with Pulmonology upcoming in the next 2-3 weeks.       /79   Pulse 109   Resp 16   Wt 109.3 kg (241 lb)   SpO2 96%   BMI 30.94 kg/m      On exam, he is awake, alert, oriented x3.  He is able to provide a coherent history.  There is no ptosis or ophthalmoparesis.  Ductions and versions of the eyes are normal.  There is no weakness of orbicularis oculi.  There is perhaps minimal weakness of cheek puff but lip seal is normal.  Tongue shows no atrophy or fasciculations.  Genioglossus strength is normal.  Pterygoid strength is fine.  There is no dysphonia or dysarthria.  His neck flexion and extension strength are 5 today.  Strength in the limbs is as follows on MRC scale (right over left): Deltoids 5/4, biceps 5/5, triceps 5/5, shoulder external rotation 4/5, wrist extensors and finger extensors 5/5.  FDI 5/5.  APB 5/5.  Hand  and long finger flexors 5/5.  In the legs, he has hip flexion of 4- on the right, 4 on the left.  Knee extension, knee flexion, foot dorsiflexion bilaterally 5.  Great toe extension is 4- on the left, 4 on the right.  Tone was normal in arms and legs.  Reflexes were absent in the legs and they were 2+ in biceps and brachioradialis and symmetric and 1+ in bilateral triceps.  Toes were downgoing.  He had no vibration sensation at the toes or ankles.  He had reduced vibration at the kneecaps at about 4-5 seconds and slightly reduced at the index fingers at 13 seconds. Position sensation was intact at the toes.  Finger-to-nose was done without dysmetria.  He did not have any clear sensory loss in digits 1-5 of the right versus left hand.  Sensation of the lateral and medial forearms to light touch and pinprick was also normal.    In summary, I think that Mr. 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  The occurrence of the symptoms after diarrhea is characteristic.  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   This diagnosis is favored by the acute onset of severe neck pain radiating to the shoulders, simultaneous diaphragmatic paralysis and also patchy weakness of C5-C6 muscles in both arms, particularly the right (shoulder external rotation).  Guillain-[place] is not out of the question, but I have to point out that there are some atypical features for this diagnosis.  It is extraordinarily rare for a Guillain-[place] patient to end up getting intubated from severe diaphragmatic weakness when they are still ambulatory and still can use their proximal arms with strength 4 or more.  Most patients with Guillain-[place] who end up with intubation are at least paraparetic, if not quadriparetic, with severe neck weakness.  That was never the case here.  Of course, there are no reflexes in his lower extremities and he has sensory loss, but this could be related to neuropathy from longstanding diabetes.   Regardless of which of the above diagnoses is correct, the management right now would not change.  I explained to him that both are monophasic inflammatory nerve disorders that can be triggered by viral illnesses, including diarrhea, vaccination, surgery, etc.  They tend to improve slowly over time. Recovery of phrenic nerve injuries in  Parsonage-Naik syndrome can be very slow, and may take more than 1 year and is sometimes incomplete.  Therefore, I expect that the patient will continue needing BiPAP at night for a long time and he may need to use it briefly during the day for 30 minutes to an hour as necessary.  However, the prognosis, ultimately, is optimistic.  There is no indication for further immunotherapy at this point.  He should continue following with his local pulmonologist regularly for the next year. My experience with diaphragmatic stimulation or pacing is extremely limited in this setting and I would not recommend such procedures unless at least 1 year has passed from the onset of illness and no improvement in his  spirometry test is noted. I explained that the chance of recurrence of those conditions is generally low.     He asked me whether this condition is hereditary and I told him that hereditary neuralgic amyotrophy does exist, but it is very unlikely to start at age 71 and there is no positive family history of this condition.  Therefore, I do not think this is likely here.     I asked him to return to our clinic for an EMG to try to differentiate between the two entities.  I do not think he has myasthenia or myopathy and I asked him to stop pyridostigmine.  He can continue tapering the prednisone and he should stop the Bactrim when the prednisone dose is below 20 mg a day.  He will be seen in followup as necessary.    Total time spent on this encounter today 50 minutes including 30 with the patient, 10 on post-visit note dictation, editing and orders, 10 in pre-visit chart review.    Sincerely,    Cristofer Green MD

## 2021-11-29 NOTE — PROGRESS NOTES
Service Date: 2021    MD [facility]  [address] [place]  [address]    RE:  eFr Chiang  MRN:  [id number]  :  1950    Dear Berkley:    I had the pleasure to see Mr. Chiang in the [facility] to establish care. He was escorted today by his wife and daughter.  His story is as follows. He was in good state of health until early 2021.  At that time, he had a non-bloody diarrheal illness for about 3 days.  He did not have a fever.  He has taken the COVID-19 Moderna vaccine, but his last dose was 2021.  A few days after the diarrhea resolved, he noticed severe orthopnea.  He could not lay flat in bed and he had major dyspnea on exertion.  This was completely new.  He went to a local urgent care and he was prescribed antibiotics for walking pneumonia.  Two days later he was checked up again in an urgent care because his symptoms were worsening and he was asked to have an outpatient evaluation with Pulmonology in 2 weeks.  He ultimately ended up in the [facility] where, a couple of days after his presentation, he ended up intubated.  Imaging including x-ray and CT showed bilateral diaphragmatic elevation consistent with paralysis and he had extremely low MIP and extremely low FEV1 and FVC values with normal ratio consistent with this diagnosis.  There was no other cardiac or pulmonary cause of his presentation.  Shortly before  he ended up in [place], he had a few episodes where his legs would give out, right more than left, and he would fall.  It should be noted, however, that he was able to walk into [facility] and did not require a wheelchair.  He was also able to use his arms and raise them overhead until the day he was intubated and he was able to  things and did not lose his ability to brush his teeth, comb his hair, cut his food or do other activities with his arms while he experienced  respiratory failure symptoms.  He now denies any dysphagia, dysarthria, dysphonia, sialorrhea, head drop, ptosis or diplopia.  It is my impression that he did not have any of those symptoms in early September either.  He did experience, however, a very severe sharp pain in the midline of the neck radiating towards both shoulders.  On the right arm, that pain and jolt like sensation was radiating from the shoulder down the mid forearm and digit 3 which continues to intermittently feel numb. Otherwise, he does not have any numbness or tingling in the hands or feet.      Initially, it was thought he could have Guillain-[place].  He had a spinal tap showing a protein that is actually normal for a 71-year-old with diabetes and was only 47 mg/dL.  There was no pleocytosis.  No other CSF abnormality.  He did not have an EMG. CK was within normal limits.  Acetylcholine receptor antibody binding, blocking modulating and MuSK antibodies for myasthenia were all negative.  He was treated empirically with plasmapheresis every other day x5 and he was also prescribed high dose oral prednisone starting at 50 mg with a gradual taper.  He is now on 25 mg daily and he was asked to taper by 5 mg every week.  He was also given pyridostigmine 60 mg t.i.d. and glycopyrrolate for sialorrhea as well as Bactrim for PCP prophylaxis.      Now he feels somewhat improved.  He underwent tracheostomy and gastrostomy at some point, but he was able to remove both tubes.  He still uses the BiPAP all night.  Otherwise, he cannot sleep.  He does not use it during the day.  He has an appointment with Pulmonology upcoming in the next 2-3 weeks.      /79   Pulse 109   Resp 16   Wt 109.3 kg (241 lb)   SpO2 96%   BMI 30.94 kg/m      On exam, he is awake, alert, oriented x3.  He is able to provide a coherent history.  There is no ptosis or ophthalmoparesis.  Ductions and versions of the eyes are normal.  There is no weakness of orbicularis oculi.   There is perhaps minimal weakness of cheek puff but lip seal is normal.  Tongue shows no atrophy or fasciculations.  Genioglossus strength is normal.  Pterygoid strength is fine.  There is no dysphonia or dysarthria.  His neck flexion and extension strength are 5 today.  Strength in the limbs is as follows on MRC scale (right over left): Deltoids 5/4, biceps 5/5, triceps 5/5, shoulder external rotation 4/5, wrist extensors and finger extensors 5/5.  FDI 5/5.  APB 5/5.  Hand  and long finger flexors 5/5.  In the legs, he has hip flexion of 4- on the right, 4 on the left.  Knee extension, knee flexion, foot dorsiflexion bilaterally 5.  Great toe extension is 4- on the left, 4 on the right.  Tone was normal in arms and legs.  Reflexes were absent in the legs and they were 2+ in biceps and brachioradialis and symmetric and 1+ in bilateral triceps.  Toes were downgoing.  He had no vibration sensation at the toes or ankles.  He had reduced vibration at the kneecaps at about 4-5 seconds and slightly reduced at the index fingers at 13 seconds. Position sensation was intact at the toes.  Finger-to-nose was done without dysmetria.  He did not have any clear sensory loss in digits 1-5 of the right versus left hand.  Sensation of the lateral and medial forearms to light touch and pinprick was also normal.    In summary, I think that Mr. 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  The occurrence of the symptoms after diarrhea is characteristic.  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  This diagnosis is favored by the acute onset of severe neck pain radiating to the shoulders, simultaneous diaphragmatic paralysis and also patchy weakness of C5-C6 muscles in both arms, particularly the right (shoulder external rotation).  Guillain-[place] is not out of the question, but I have to point out that  there are some atypical features for this diagnosis.  It is extraordinarily rare for a Guillain-Drumright patient to end up getting intubated from severe diaphragmatic weakness when they are still ambulatory and still can use their proximal arms with strength 4 or more.  Most patients with Guillain-Drumright who end up with intubation are at least paraparetic, if not quadriparetic, with severe neck weakness.  That was never the case here.  Of course, there are no reflexes in his lower extremities and he has sensory loss, but this could be related to neuropathy from longstanding diabetes.   Regardless of which of the above diagnoses is correct, the management right now would not change.  I explained to him that both are monophasic inflammatory nerve disorders that can be triggered by viral illnesses, including diarrhea, vaccination, surgery, etc.  They tend to improve slowly over time. Recovery of phrenic nerve injuries in  Parsonage-Naik syndrome can be very slow, and may take more than 1 year and is sometimes incomplete.  Therefore, I expect that the patient will continue needing BiPAP at night for a long time and he may need to use it briefly during the day for 30 minutes to an hour as necessary.  However, the prognosis, ultimately, is optimistic.  There is no indication for further immunotherapy at this point.  He should continue following with his local pulmonologist regularly for the next year. My experience with diaphragmatic stimulation or pacing is extremely limited in this setting and I would not recommend such procedures unless at least 1 year has passed from the onset of illness and no improvement in his spirometry test is noted. I explained that the chance of recurrence of those conditions is generally low.     He asked me whether this condition is hereditary and I told him that hereditary neuralgic amyotrophy does exist, but it is very unlikely to start at age 71 and there is no positive family history of  this condition.  Therefore, I do not think this is likely here.     I asked him to return to our clinic for an EMG to try to differentiate between the two entities.  I do not think he has myasthenia or myopathy and I asked him to stop pyridostigmine.  He can continue tapering the prednisone and he should stop the Bactrim when the prednisone dose is below 20 mg a day.  He will be seen in followup as necessary.    Total time spent on this encounter today 50 minutes including 30 with the patient, 10 on post-visit note dictation, editing and orders, 10 in pre-visit chart review.    Sincerely,    Cristofer Green MD        D: 2021   T: 2021   MT: marla    Name:     MARLENASPARKLE UMANZORCRISTINO MOORE  MRN:      [id number]        Account:      [id number]   :      1950           Service Date: 2021       Document: [id number]

## 2022-01-04 ENCOUNTER — OFFICE VISIT (OUTPATIENT)
Dept: NEUROLOGY | Facility: CLINIC | Age: 72
End: 2022-01-04
Payer: COMMERCIAL

## 2022-01-04 DIAGNOSIS — G56.01 CARPAL TUNNEL SYNDROME OF RIGHT WRIST: ICD-10-CM

## 2022-01-04 DIAGNOSIS — G54.5 NEURALGIC AMYOTROPHY: ICD-10-CM

## 2022-01-04 DIAGNOSIS — G62.89 AXONAL SENSORIMOTOR NEUROPATHY: ICD-10-CM

## 2022-01-04 DIAGNOSIS — G58.8 PHRENIC NEUROPATHY: ICD-10-CM

## 2022-01-04 DIAGNOSIS — G56.21 ULNAR NEUROPATHY AT ELBOW, RIGHT: ICD-10-CM

## 2022-01-04 DIAGNOSIS — G54.1 LUMBOSACRAL PLEXOPATHY: Primary | ICD-10-CM

## 2022-01-04 PROCEDURE — 95885 MUSC TST DONE W/NERV TST LIM: CPT | Mod: 59 | Performed by: PSYCHIATRY & NEUROLOGY

## 2022-01-04 PROCEDURE — 95910 NRV CNDJ TEST 7-8 STUDIES: CPT | Mod: GC | Performed by: PSYCHIATRY & NEUROLOGY

## 2022-01-04 PROCEDURE — 95886 MUSC TEST DONE W/N TEST COMP: CPT | Mod: GC | Performed by: PSYCHIATRY & NEUROLOGY

## 2022-01-04 NOTE — PROGRESS NOTES
[facility]  Electrodiagnostic Laboratory                 Department of Neurology                                                                                                         Test Date:  2022    Patient: Fer Minor : 1950 Physician: Cristofer Green MD   Sex: Male AGE: 71 year Ref Phys:    ID#: [id number]   Technician: MARIFER Pastor     Clinical Information:    71 year old male with a history of orthopnea and restrictive spirometry results consistent with bilateral diaphragm palsy that occurred acutely in early 2021 after a diarrheal illness of a few days. His breathing has slightly improved now. He has type 2 diabetes for several years. Examination showed weakness of right shoulder external rotation, left deltoid, and bilateral hip flexors. Reflexes are reduced throughout and there is distal sensory loss to all modalities at the feet. 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     Techniques:    Motor and sensory conduction studies were done with surface recording electrodes. EMG was done with a concentric needle electrode.     Results:  Evaluation of the right Dp Branch Fibular (TA) motor nerve showed reduced CMAP amplitudes without segmental changes, and normal distal latency. The right Fibular (EDB) and Tibial (AHB) motor nerves showed no response. The right Median (APB) motor nerve showed prolonged distal latency with normal amplitudes and conduction velocity.  The right Ulnar (ADM) motor nerve showed normal distal latency and CMAP amplitudes, normal conduction velocities at the forearm and upper arm, and decreased conduction velocity at the elbow.  The right median/ulnar (second lumbricals-palmar interossei ) comparison motor study showed increased latency difference (0.8 ms, normal is <0.5 ms).     The right antidromic sural and ulnar sensory nerves showed no response. The right antidromic  median and radial sensory nerves showed reduced amplitudes and either reduced (median) or normal (radial) conduction velocities.    Needle evaluation of the right First Dorsal Interosseous, Pronator Teres, Biceps Brachii, and Triceps Brachii showed mild to moderately reduced recruitment of increased amplitude, prolonged duration motor units with the First Dorsal Interosseous showing increased fasciculations. The right deltoid showed mildly reduced recruitment of normal appearing motor units. The right infraspinatus, supraspinatus, Tibialis Anterior, and Vastus Medius showed increased Fibs/PSW (1-3+) with mildly reduced to discrete recruitment of abnormal motor units with variably increased amplitude, duration, and polyphasia. The right infraspinatus and supraspinatus were most severely affected. The right Gastrocnemius (Medial Hd) showed normal recruitment of large amplitude, prolonged duration units. The Adductor Longus showed increased Fibs/PSW (3+) with normal recruitment of polyphasic motor units.       Interpretation:    Abnormal study. There is electrophysiologic evidence to support a (1) chronic right C5, 6 and 7 radiculopathy vs upper and middle trunk plexopathy. (2) Possible superimposed right suprascapular mononeuropathy given affliction of the right infraspinatus and supraspinatus out of proportion to other C5-6 muscles; a (2) right active L3, 4, and possibly L5 polyradiculopathy vs less likely plexopathy. 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 There is no electrophysiologic evidence of a primary demyelinating neuropathy to suggest AIDP, although a previous axonal variant of Guillain [place] syndrome could produce those findings. 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    ___________________________  Jenny Nieto MD, Neuromuscular Fellow    ATTENDING ADDENDUM: I was present during the entire study and directly supervised Fellow Dr Nieto, who performed it. I agree with the analysis of results and interpretation as above, which I personally reviewed and edited. Cristofer Green MD        Nerve Conduction Studies  Motor Sites          Latency Amplitude Neg. Amp Diff Segment Distance Velocity Neg. Dur Neg Area Diff Temperature Comment   Site (ms) Norm (mV) Norm %  cm m/s Norm ms %  C    Right Dp Branch Fibular (TA) Motor   Fib Head 3.3  < 4.2 1.42 -      9.4  31    Pop Fossa 5.9  < 5.7 1.66 - 16.9 Pop Fossa-Fib Head - - - 7.9 -2.4 31    Right Fibular (EDB) Motor   Ankle NR  < 6.0 NR  > 2.0  Ankle-EDB 8   NR  24.3    Right Median (APB) Motor   Wrist 4.6  < 4.2 5.1  > 5.0  Wrist-APB 8   5.3  32.5    Elbow 9.6 - 4.8 - -5.9 Elbow-Wrist 25 50  > 48 6.7 0.78 32.5    Right Median/Ulnar (Lumb-Dors Int II) Motor        Median (Lumb I)   Wrist 4.2 - 0.87 -      -  32         Ulnar (Dorsal Int (manus))   Wrist 3.4 - 2.2 -      4.1  32.1    Right Tibial (AHB) Motor   Ankle NR  < 6.5 NR  > 4.4  Ankle-AHB 8   NR  24.3    Right Ulnar (ADM) Motor   Wrist 3.3  < 3.5 6.8  > 5.0  Wrist-ADM 8   6.1  32    Bel Elbow 7.5 - 6.1 - -10.3 Bel Elbow-Wrist 25 60  > 48 7.3 8.9 32.1    Abv Elbow 10.2 - 5.4 - -11.5 Abv Elbow-Bel Elbow 10 37  > 48 7.1 -4.1 32    Erb's Pt. 12.2 - 5.5 - 1.85 Erb's Pt.-Abv Elbow 11 55 - 8.1 1.22 32      Sensory Sites      Onset Lat Peak Lat Amp (O-P) Amp (P-P) Segment Distance Velocity Temperature   Site ms ms  V Norm  V  cm m/s Norm  C   Right Median Sensory   Wrist-Dig II 3.2 4.1 7  > 10 11 Wrist-Dig II 14 44  > 48 32   Right Radial Sensory   Forearm-Wrist 1.93 2.9 11  > 15 15 Forearm-Wrist 10 52 - 32   Right Sural Sensory   Calf-Lat Mall NR NR NR  > 5 NR Calf-Lat Mall 14 NR  > 38 31   Right Ulnar Sensory    Wrist-Dig V NR NR NR  > 8 NR Wrist-Dig V 12.5 NR  > 48 32.4       Electromyography     Side Muscle Ins Act Fibs/PSW Fasc HF Amp Dur Poly Recrt Int Pat   Right FDI Nml None 2+ 0 2+ 2+ 0 Chapin Nml   Right Pronator Teres Nml None Nml 0 1+ 1+ 0 ModRed Nml   Right Biceps Nml None Nml 0 1+ 1+ 0 ModRed Nml   Right Triceps Nml None Nml 0 1+ 2+ 0 Chapin Nml   Right Deltoid Nml None Nml 0 Nml Nml 0 Chapin Nml   Right Infraspin Nml 3+ Nml 0 Nml Nml 2+ Discrete Nml   Right Supraspin Nml 1+ Nml 0 1+ Nml 1+ ModRed Nml   Right Tib Anterior Nml 1+ Nml 0 1+ 1+ 0 ModRed Nml   Right Gastroc MH Nml None Nml 0 2+ 1+ 0 Nml Nml   Right Vastus Med Nml 2+ Nml 0 Nml Nml 1+ Chapin Nml   Right Add Longus Nml 3+ Nml 0 Nml Nml 2+ Nml Nml             NCS Waveforms:    Motor                    Sensory

## 2022-01-04 NOTE — LETTER
2022       RE: Fer Minor  [address] [place] [id number]     Dear Colleague,    Thank you for referring your patient, Fer Minor, to the [facility] [place] [facility]. Please see a copy of my visit note below.                              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 His breathing has slightly improved now. He has type 2 diabetes for several years. Examination showed weakness of right shoulder external rotation, left deltoid, and bilateral hip flexors. Reflexes are reduced throughout and there is distal sensory loss to all modalities at the feet. 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     Techniques:    Motor and sensory conduction studies were done with surface recording electrodes. EMG was done with a concentric needle electrode.     Results:  Evaluation of the right Dp Branch Fibular (TA) motor nerve showed reduced CMAP amplitudes without segmental changes, and normal distal latency. The right Fibular (EDB) and Tibial (AHB) motor nerves showed no response. The right Median (APB) motor nerve showed prolonged distal latency with normal amplitudes and conduction velocity.  The right Ulnar (ADM) motor nerve showed normal distal latency and CMAP amplitudes, normal conduction velocities at the forearm and  upper arm, and decreased conduction velocity at the elbow.  The right median/ulnar (second lumbricals-palmar interossei ) comparison motor study showed increased latency difference (0.8 ms, normal is <0.5 ms).     The right antidromic sural and ulnar sensory nerves showed no response. The right antidromic median and radial sensory nerves showed reduced amplitudes and either reduced (median) or normal (radial) conduction velocities.    Needle evaluation of the right First Dorsal Interosseous, Pronator Teres, Biceps Brachii, and Triceps Brachii showed mild to moderately reduced recruitment of increased amplitude, prolonged duration motor units with the First Dorsal Interosseous showing increased fasciculations. The right deltoid showed mildly reduced recruitment of normal appearing motor units. The right infraspinatus, supraspinatus, Tibialis Anterior, and Vastus Medius showed increased Fibs/PSW (1-3+) with mildly reduced to discrete recruitment of abnormal motor units with variably increased amplitude, duration, and polyphasia. The right infraspinatus and supraspinatus were most severely affected. The right Gastrocnemius (Medial Hd) showed normal recruitment of large amplitude, prolonged duration units. The Adductor Longus showed increased Fibs/PSW (3+) with normal recruitment of polyphasic motor units.       Interpretation:    Abnormal study. There is electrophysiologic evidence to support a (1) chronic right C5, 6 and 7 radiculopathy vs upper and middle trunk plexopathy. (2) Possible superimposed right suprascapular mononeuropathy given affliction of the right infraspinatus and supraspinatus out of proportion to other C5-6 muscles; a (2) right active L3, 4, and possibly L5 polyradiculopathy vs less likely plexopathy. 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 There is no electrophysiologic evidence of a primary demyelinating neuropathy to suggest AIDP,  although a previous axonal variant of Guillain [place] syndrome could produce those findings. Additionally, there is evidence of a (3) length-dependent sensorimotor axonal polyneuropathy, a (4) right ulnar neuropathy, localized to the elbow, consistent with cubital tunnel syndrome, and a (5) right median neuropathy, localized to the wrist, consistent with carpal tunnel syndrome.    ___________________________  Jenny Nieto MD, Neuromuscular Fellow    ATTENDING ADDENDUM: I was present during the entire study and directly supervised Fellow Dr Nieto, who performed it. I agree with the analysis of results and interpretation as above, which I personally reviewed and edited. Cristofer Green MD        Nerve Conduction Studies  Motor Sites          Latency Amplitude Neg. Amp Diff Segment Distance Velocity Neg. Dur Neg Area Diff Temperature Comment   Site (ms) Norm (mV) Norm %  cm m/s Norm ms %  C    Right Dp Branch Fibular (TA) Motor   Fib Head 3.3  < 4.2 1.42 -      9.4  31    Pop Fossa 5.9  < 5.7 1.66 - 16.9 Pop Fossa-Fib Head - - - 7.9 -2.4 31    Right Fibular (EDB) Motor   Ankle NR  < 6.0 NR  > 2.0  Ankle-EDB 8   NR  24.3    Right Median (APB) Motor   Wrist 4.6  < 4.2 5.1  > 5.0  Wrist-APB 8   5.3  32.5    Elbow 9.6 - 4.8 - -5.9 Elbow-Wrist 25 50  > 48 6.7 0.78 32.5    Right Median/Ulnar (Lumb-Dors Int II) Motor        Median (Lumb I)   Wrist 4.2 - 0.87 -      -  32         Ulnar (Dorsal Int (manus))   Wrist 3.4 - 2.2 -      4.1  32.1    Right Tibial (AHB) Motor   Ankle NR  < 6.5 NR  > 4.4  Ankle-AHB 8   NR  24.3    Right Ulnar (ADM) Motor   Wrist 3.3  < 3.5 6.8  > 5.0  Wrist-ADM 8   6.1  32    Bel Elbow 7.5 - 6.1 - -10.3 Bel Elbow-Wrist 25 60  > 48 7.3 8.9 32.1    Abv Elbow 10.2 - 5.4 - -11.5 Abv Elbow-Bel Elbow 10 37  > 48 7.1 -4.1 32    Erb's Pt. 12.2 - 5.5 - 1.85 Erb's Pt.-Abv Elbow 11 55 - 8.1 1.22 32      Sensory Sites      Onset Lat Peak Lat Amp (O-P) Amp (P-P) Segment Distance Velocity Temperature   Site ms  ms  V Norm  V  cm m/s Norm  C   Right Median Sensory   Wrist-Dig II 3.2 4.1 7  > 10 11 Wrist-Dig II 14 44  > 48 32   Right Radial Sensory   Forearm-Wrist 1.93 2.9 11  > 15 15 Forearm-Wrist 10 52 - 32   Right Sural Sensory   Calf-Lat Mall NR NR NR  > 5 NR Calf-Lat Mall 14 NR  > 38 31   Right Ulnar Sensory   Wrist-Dig V NR NR NR  > 8 NR Wrist-Dig V 12.5 NR  > 48 32.4       Electromyography     Side Muscle Ins Act Fibs/PSW Fasc HF Amp Dur Poly Recrt Int Pat   Right FDI Nml None 2+ 0 2+ 2+ 0 Chapin Nml   Right Pronator Teres Nml None Nml 0 1+ 1+ 0 ModRed Nml   Right Biceps Nml None Nml 0 1+ 1+ 0 ModRed Nml   Right Triceps Nml None Nml 0 1+ 2+ 0 Chapin Nml   Right Deltoid Nml None Nml 0 Nml Nml 0 Chapin Nml   Right Infraspin Nml 3+ Nml 0 Nml Nml 2+ Discrete Nml   Right Supraspin Nml 1+ Nml 0 1+ Nml 1+ ModRed Nml   Right Tib Anterior Nml 1+ Nml 0 1+ 1+ 0 ModRed Nml   Right Gastroc MH Nml None Nml 0 2+ 1+ 0 Nml Nml   Right Vastus Med Nml 2+ Nml 0 Nml Nml 1+ Chapin Nml   Right Add Longus Nml 3+ Nml 0 Nml Nml 2+ Nml Nml             NCS Waveforms:    Motor                    Sensory                    Again, thank you for allowing me to participate in the care of your patient.      Sincerely,    Cristofer Green MD

## 2024-09-11 ENCOUNTER — LAB REQUISITION (OUTPATIENT)
Dept: LAB | Facility: CLINIC | Age: 74
End: 2024-09-11
Payer: COMMERCIAL

## 2024-09-11 DIAGNOSIS — Z11.1 ENCOUNTER FOR SCREENING FOR RESPIRATORY TUBERCULOSIS: ICD-10-CM

## 2024-09-12 ENCOUNTER — LAB REQUISITION (OUTPATIENT)
Dept: LAB | Facility: CLINIC | Age: 74
End: 2024-09-12
Payer: COMMERCIAL

## 2024-09-12 DIAGNOSIS — I10 ESSENTIAL (PRIMARY) HYPERTENSION: ICD-10-CM

## 2024-09-13 LAB
ANION GAP SERPL CALCULATED.3IONS-SCNC: 10 MMOL/L (ref 7–15)
BUN SERPL-MCNC: 9.6 MG/DL (ref 8–23)
CALCIUM SERPL-MCNC: 8.8 MG/DL (ref 8.8–10.4)
CHLORIDE SERPL-SCNC: 99 MMOL/L (ref 98–107)
CREAT SERPL-MCNC: 0.66 MG/DL (ref 0.67–1.17)
EGFRCR SERPLBLD CKD-EPI 2021: >90 ML/MIN/1.73M2
GLUCOSE SERPL-MCNC: 181 MG/DL (ref 70–99)
HCO3 SERPL-SCNC: 24 MMOL/L (ref 22–29)
POTASSIUM SERPL-SCNC: 4 MMOL/L (ref 3.4–5.3)
SODIUM SERPL-SCNC: 133 MMOL/L (ref 135–145)

## 2024-09-13 PROCEDURE — 86481 TB AG RESPONSE T-CELL SUSP: CPT | Mod: ORL | Performed by: INTERNAL MEDICINE

## 2024-09-13 PROCEDURE — 80048 BASIC METABOLIC PNL TOTAL CA: CPT | Mod: ORL | Performed by: INTERNAL MEDICINE

## 2024-09-13 PROCEDURE — 36415 COLL VENOUS BLD VENIPUNCTURE: CPT | Mod: ORL | Performed by: INTERNAL MEDICINE

## 2024-09-13 PROCEDURE — P9604 ONE-WAY ALLOW PRORATED TRIP: HCPCS | Mod: ORL | Performed by: INTERNAL MEDICINE

## 2024-09-15 LAB
GAMMA INTERFERON BACKGROUND BLD IA-ACNC: 0.08 IU/ML
M TB IFN-G BLD-IMP: NEGATIVE
M TB IFN-G CD4+ BCKGRND COR BLD-ACNC: 8.6 IU/ML
MITOGEN IGNF BCKGRD COR BLD-ACNC: 0.02 IU/ML
MITOGEN IGNF BCKGRD COR BLD-ACNC: 0.02 IU/ML
QUANTIFERON MITOGEN: 8.68 IU/ML
QUANTIFERON NIL TUBE: 0.08 IU/ML
QUANTIFERON TB1 TUBE: 0.1 IU/ML
QUANTIFERON TB2 TUBE: 0.1

## 2024-09-17 ENCOUNTER — LAB REQUISITION (OUTPATIENT)
Dept: LAB | Facility: CLINIC | Age: 74
End: 2024-09-17
Payer: COMMERCIAL

## 2024-09-17 DIAGNOSIS — E87.6 HYPOKALEMIA: ICD-10-CM

## 2024-09-19 LAB
ANION GAP SERPL CALCULATED.3IONS-SCNC: 13 MMOL/L (ref 7–15)
BUN SERPL-MCNC: 11.2 MG/DL (ref 8–23)
CALCIUM SERPL-MCNC: 9.3 MG/DL (ref 8.8–10.4)
CHLORIDE SERPL-SCNC: 100 MMOL/L (ref 98–107)
CREAT SERPL-MCNC: 0.68 MG/DL (ref 0.67–1.17)
EGFRCR SERPLBLD CKD-EPI 2021: >90 ML/MIN/1.73M2
GLUCOSE SERPL-MCNC: 135 MG/DL (ref 70–99)
HCO3 SERPL-SCNC: 24 MMOL/L (ref 22–29)
POTASSIUM SERPL-SCNC: 4.3 MMOL/L (ref 3.4–5.3)
SODIUM SERPL-SCNC: 137 MMOL/L (ref 135–145)

## 2024-09-19 PROCEDURE — 80048 BASIC METABOLIC PNL TOTAL CA: CPT | Mod: ORL | Performed by: NURSE PRACTITIONER

## 2024-09-19 PROCEDURE — P9603 ONE-WAY ALLOW PRORATED MILES: HCPCS | Mod: ORL | Performed by: NURSE PRACTITIONER

## 2024-09-19 PROCEDURE — 36415 COLL VENOUS BLD VENIPUNCTURE: CPT | Mod: ORL | Performed by: NURSE PRACTITIONER
